# Patient Record
Sex: MALE | Race: WHITE | NOT HISPANIC OR LATINO | URBAN - METROPOLITAN AREA
[De-identification: names, ages, dates, MRNs, and addresses within clinical notes are randomized per-mention and may not be internally consistent; named-entity substitution may affect disease eponyms.]

---

## 2019-05-23 ENCOUNTER — EMERGENCY (EMERGENCY)
Facility: HOSPITAL | Age: 59
LOS: 1 days | Discharge: HOME | End: 2019-05-23
Attending: EMERGENCY MEDICINE | Admitting: EMERGENCY MEDICINE
Payer: COMMERCIAL

## 2019-05-23 VITALS
WEIGHT: 199.96 LBS | SYSTOLIC BLOOD PRESSURE: 169 MMHG | HEART RATE: 62 BPM | TEMPERATURE: 98 F | OXYGEN SATURATION: 100 % | DIASTOLIC BLOOD PRESSURE: 97 MMHG | RESPIRATION RATE: 16 BRPM

## 2019-05-23 VITALS
OXYGEN SATURATION: 98 % | RESPIRATION RATE: 16 BRPM | HEART RATE: 65 BPM | TEMPERATURE: 98 F | DIASTOLIC BLOOD PRESSURE: 88 MMHG | SYSTOLIC BLOOD PRESSURE: 155 MMHG

## 2019-05-23 LAB
ANION GAP SERPL CALC-SCNC: 8 MMOL/L — LOW (ref 9–16)
BUN SERPL-MCNC: 20 MG/DL — SIGNIFICANT CHANGE UP (ref 7–23)
CALCIUM SERPL-MCNC: 9.4 MG/DL — SIGNIFICANT CHANGE UP (ref 8.5–10.5)
CHLORIDE SERPL-SCNC: 107 MMOL/L — SIGNIFICANT CHANGE UP (ref 96–108)
CO2 SERPL-SCNC: 27 MMOL/L — SIGNIFICANT CHANGE UP (ref 22–31)
CREAT SERPL-MCNC: 1.33 MG/DL — HIGH (ref 0.5–1.3)
GLUCOSE SERPL-MCNC: 106 MG/DL — HIGH (ref 70–99)
HCT VFR BLD CALC: 46.6 % — SIGNIFICANT CHANGE UP (ref 39–50)
HGB BLD-MCNC: 16.1 G/DL — SIGNIFICANT CHANGE UP (ref 13–17)
MCHC RBC-ENTMCNC: 30.7 PG — SIGNIFICANT CHANGE UP (ref 27–34)
MCHC RBC-ENTMCNC: 34.5 G/DL — SIGNIFICANT CHANGE UP (ref 32–36)
MCV RBC AUTO: 88.9 FL — SIGNIFICANT CHANGE UP (ref 80–100)
PLATELET # BLD AUTO: 258 K/UL — SIGNIFICANT CHANGE UP (ref 150–400)
POTASSIUM SERPL-MCNC: 4.6 MMOL/L — SIGNIFICANT CHANGE UP (ref 3.5–5.3)
POTASSIUM SERPL-SCNC: 4.6 MMOL/L — SIGNIFICANT CHANGE UP (ref 3.5–5.3)
RBC # BLD: 5.24 M/UL — SIGNIFICANT CHANGE UP (ref 4.2–5.8)
RBC # FLD: 12.8 % — SIGNIFICANT CHANGE UP (ref 10.3–14.5)
SODIUM SERPL-SCNC: 142 MMOL/L — SIGNIFICANT CHANGE UP (ref 132–145)
WBC # BLD: 14 K/UL — HIGH (ref 3.8–10.5)
WBC # FLD AUTO: 14 K/UL — HIGH (ref 3.8–10.5)

## 2019-05-23 PROCEDURE — 99284 EMERGENCY DEPT VISIT MOD MDM: CPT

## 2019-05-23 PROCEDURE — 72131 CT LUMBAR SPINE W/O DYE: CPT | Mod: 26

## 2019-05-23 RX ORDER — DIAZEPAM 5 MG
5 TABLET ORAL ONCE
Refills: 0 | Status: DISCONTINUED | OUTPATIENT
Start: 2019-05-23 | End: 2019-05-23

## 2019-05-23 RX ORDER — CYCLOBENZAPRINE HYDROCHLORIDE 10 MG/1
10 TABLET, FILM COATED ORAL ONCE
Refills: 0 | Status: COMPLETED | OUTPATIENT
Start: 2019-05-23 | End: 2019-05-23

## 2019-05-23 RX ORDER — KETOROLAC TROMETHAMINE 30 MG/ML
30 SYRINGE (ML) INJECTION ONCE
Refills: 0 | Status: DISCONTINUED | OUTPATIENT
Start: 2019-05-23 | End: 2019-05-23

## 2019-05-23 RX ORDER — DIAZEPAM 5 MG
10 TABLET ORAL ONCE
Refills: 0 | Status: DISCONTINUED | OUTPATIENT
Start: 2019-05-23 | End: 2019-05-23

## 2019-05-23 RX ORDER — DEXAMETHASONE 0.5 MG/5ML
4 ELIXIR ORAL ONCE
Refills: 0 | Status: COMPLETED | OUTPATIENT
Start: 2019-05-23 | End: 2019-05-23

## 2019-05-23 RX ADMIN — Medication 30 MILLIGRAM(S): at 12:35

## 2019-05-23 RX ADMIN — Medication 10 MILLIGRAM(S): at 12:34

## 2019-05-23 RX ADMIN — Medication 30 MILLIGRAM(S): at 13:30

## 2019-05-23 RX ADMIN — CYCLOBENZAPRINE HYDROCHLORIDE 10 MILLIGRAM(S): 10 TABLET, FILM COATED ORAL at 15:10

## 2019-05-23 RX ADMIN — Medication 4 MILLIGRAM(S): at 12:34

## 2019-05-23 NOTE — ED PROVIDER NOTE - CLINICAL SUMMARY MEDICAL DECISION MAKING FREE TEXT BOX
60 y/o male with PMHx of chronic back pain presents to ED c/o lower back pain. Patient reports left lower back pain radiating down the left hip and the left leg for the past 6 days. States he was seen at [facility] yesterday and given steroids and muscle relaxer with improvement of pain. However, reports he was lifting a trash bag today and pain recurred. Patient denies any numbness, tingling, weakness, or rash. States he has had intermittent episodes of lower back spasm for the past 10 years, usually treated with NSAIDs and steroids.

## 2019-05-23 NOTE — ED PROVIDER NOTE - CARE PROVIDER_API CALL
Marcus Garcia)  Orthopedics  [address] [address]  Phone: [phone]  Fax: [phone]  Follow Up Time:     Timothy Marie)  Pain Medicine; PhysicalRehab Medicine  [address] [place] [id number]  Phone: [phone]  Fax: [phone]  Follow Up Time:

## 2019-05-23 NOTE — ED ADULT NURSE NOTE - NSELOPED_ED_ALL_ED
Call was placed to patient's given phone number to arrange for patient to  instructions and/or prescription./Physician notified

## 2019-05-23 NOTE — SBIRT NOTE. - NSSBIRTSERVICES_GEN_A_ED_FT
Provided SBIRT services : Full screen positive. Positive reinforcement provided given patient currently within healthy guidelines. Educational materials reviewed and given to patient .     Audit Score : 5    Dast Score :0    Duration : 5 Minutes

## 2019-05-23 NOTE — ED ADULT NURSE NOTE - NSIMPLEMENTINTERV_GEN_ALL_ED
Implemented All Universal Safety Interventions:  Bertha to call system. Call bell, personal items and telephone within reach. Instruct patient to call for assistance. Room bathroom lighting operational. Non-slip footwear when patient is off stretcher. Physically safe environment: no spills, clutter or unnecessary equipment. Stretcher in lowest position, wheels locked, appropriate side rails in place.

## 2019-05-23 NOTE — ED PROVIDER NOTE - OBJECTIVE STATEMENT
58 y/o male with PMHx of chronic back pain presents to ED c/o lower back pain. Patient reports left lower back pain radiating down the left hip and the left leg for the past 6 days. States he was seen at [facility] yesterday and given steroids and muscle relaxer with improvement of pain. However, reports he was lifting a trash bag today and pain recurred. Patient denies any numbness, tingling, weakness, or rash. States he has had intermittent episodes of lower back spasm for the past 10 years, usually treated with NSAIDs and steroids.

## 2019-05-23 NOTE — ED POST DISCHARGE NOTE - REASON FOR FOLLOW-UP
Other patient left with his IV accidentally but took it out at home and sent a photo of IV out and band aid on arm

## 2019-05-27 DIAGNOSIS — M54.5 LOW BACK PAIN: ICD-10-CM

## 2019-05-27 DIAGNOSIS — G89.29 OTHER CHRONIC PAIN: ICD-10-CM

## 2019-05-27 DIAGNOSIS — M54.42 LUMBAGO WITH SCIATICA, LEFT SIDE: ICD-10-CM

## 2020-07-05 NOTE — ED ADULT NURSE NOTE - NS ED NURSE ELOPE COMMENTS
Applied
patient left with iv in arm, was contacted later, and refused to come back. iv was removed by patient at home, and picture was sent. see Dr Tai note. Charge RN made aware.

## 2023-05-12 DIAGNOSIS — G47.00 INSOMNIA, UNSPECIFIED TYPE: ICD-10-CM

## 2023-05-12 RX ORDER — ZOLPIDEM TARTRATE 10 MG/1
10 TABLET ORAL NIGHTLY
Qty: 30 TABLET | Refills: 0 | Status: SHIPPED | OUTPATIENT
Start: 2023-05-12

## 2023-05-12 RX ORDER — ZOLPIDEM TARTRATE 10 MG/1
1 TABLET ORAL NIGHTLY
COMMUNITY
Start: 2021-08-26 | End: 2023-05-12 | Stop reason: SDUPTHER

## 2023-05-30 DIAGNOSIS — Z00.00 ANNUAL PHYSICAL EXAM: ICD-10-CM

## 2023-06-22 ENCOUNTER — LAB (OUTPATIENT)
Dept: LAB | Facility: LAB | Age: 63
End: 2023-06-22
Payer: COMMERCIAL

## 2023-06-22 DIAGNOSIS — Z00.00 ANNUAL PHYSICAL EXAM: ICD-10-CM

## 2023-06-22 LAB
ALANINE AMINOTRANSFERASE (SGPT) (U/L) IN SER/PLAS: 24 U/L (ref 10–52)
ALBUMIN (G/DL) IN SER/PLAS: 4.1 G/DL (ref 3.4–5)
ALKALINE PHOSPHATASE (U/L) IN SER/PLAS: 45 U/L (ref 33–136)
ANION GAP IN SER/PLAS: 12 MMOL/L (ref 10–20)
APPEARANCE, URINE: CLEAR
ASPARTATE AMINOTRANSFERASE (SGOT) (U/L) IN SER/PLAS: 28 U/L (ref 9–39)
BASOPHILS (10*3/UL) IN BLOOD BY AUTOMATED COUNT: 0.08 X10E9/L (ref 0–0.1)
BASOPHILS/100 LEUKOCYTES IN BLOOD BY AUTOMATED COUNT: 1.1 % (ref 0–2)
BILIRUBIN TOTAL (MG/DL) IN SER/PLAS: 0.8 MG/DL (ref 0–1.2)
BILIRUBIN, URINE: NEGATIVE
BLOOD, URINE: NEGATIVE
CALCIDIOL (25 OH VITAMIN D3) (NG/ML) IN SER/PLAS: 29 NG/ML
CALCIUM (MG/DL) IN SER/PLAS: 9.4 MG/DL (ref 8.6–10.3)
CARBON DIOXIDE, TOTAL (MMOL/L) IN SER/PLAS: 27 MMOL/L (ref 21–32)
CHLORIDE (MMOL/L) IN SER/PLAS: 107 MMOL/L (ref 98–107)
CHOLESTEROL (MG/DL) IN SER/PLAS: 193 MG/DL (ref 0–199)
CHOLESTEROL IN HDL (MG/DL) IN SER/PLAS: 56.3 MG/DL
CHOLESTEROL/HDL RATIO: 3.4
COLOR, URINE: YELLOW
CREATININE (MG/DL) IN SER/PLAS: 1.04 MG/DL (ref 0.5–1.3)
EOSINOPHILS (10*3/UL) IN BLOOD BY AUTOMATED COUNT: 0.26 X10E9/L (ref 0–0.7)
EOSINOPHILS/100 LEUKOCYTES IN BLOOD BY AUTOMATED COUNT: 3.7 % (ref 0–6)
ERYTHROCYTE DISTRIBUTION WIDTH (RATIO) BY AUTOMATED COUNT: 12.6 % (ref 11.5–14.5)
ERYTHROCYTE MEAN CORPUSCULAR HEMOGLOBIN CONCENTRATION (G/DL) BY AUTOMATED: 32.1 G/DL (ref 32–36)
ERYTHROCYTE MEAN CORPUSCULAR VOLUME (FL) BY AUTOMATED COUNT: 94 FL (ref 80–100)
ERYTHROCYTES (10*6/UL) IN BLOOD BY AUTOMATED COUNT: 4.89 X10E12/L (ref 4.5–5.9)
ESTIMATED AVERAGE GLUCOSE FOR HBA1C: 108 MG/DL
GFR MALE: 81 ML/MIN/1.73M2
GLUCOSE (MG/DL) IN SER/PLAS: 85 MG/DL (ref 74–99)
GLUCOSE, URINE: NEGATIVE MG/DL
HEMATOCRIT (%) IN BLOOD BY AUTOMATED COUNT: 45.8 % (ref 41–52)
HEMOGLOBIN (G/DL) IN BLOOD: 14.7 G/DL (ref 13.5–17.5)
HEMOGLOBIN A1C/HEMOGLOBIN TOTAL IN BLOOD: 5.4 %
IMMATURE GRANULOCYTES/100 LEUKOCYTES IN BLOOD BY AUTOMATED COUNT: 0.3 % (ref 0–0.9)
KETONES, URINE: NEGATIVE MG/DL
LDL: 122 MG/DL (ref 0–99)
LEUKOCYTE ESTERASE, URINE: NEGATIVE
LEUKOCYTES (10*3/UL) IN BLOOD BY AUTOMATED COUNT: 7 X10E9/L (ref 4.4–11.3)
LYMPHOCYTES (10*3/UL) IN BLOOD BY AUTOMATED COUNT: 2.27 X10E9/L (ref 1.2–4.8)
LYMPHOCYTES/100 LEUKOCYTES IN BLOOD BY AUTOMATED COUNT: 32.5 % (ref 13–44)
MONOCYTES (10*3/UL) IN BLOOD BY AUTOMATED COUNT: 0.86 X10E9/L (ref 0.1–1)
MONOCYTES/100 LEUKOCYTES IN BLOOD BY AUTOMATED COUNT: 12.3 % (ref 2–10)
NEUTROPHILS (10*3/UL) IN BLOOD BY AUTOMATED COUNT: 3.49 X10E9/L (ref 1.2–7.7)
NEUTROPHILS/100 LEUKOCYTES IN BLOOD BY AUTOMATED COUNT: 50.1 % (ref 40–80)
NITRITE, URINE: NEGATIVE
PH, URINE: 6 (ref 5–8)
PLATELETS (10*3/UL) IN BLOOD AUTOMATED COUNT: 264 X10E9/L (ref 150–450)
POTASSIUM (MMOL/L) IN SER/PLAS: 5.1 MMOL/L (ref 3.5–5.3)
PROSTATE SPECIFIC ANTIGEN,SCREEN: 3.44 NG/ML (ref 0–4)
PROTEIN TOTAL: 6.6 G/DL (ref 6.4–8.2)
PROTEIN, URINE: NEGATIVE MG/DL
SODIUM (MMOL/L) IN SER/PLAS: 141 MMOL/L (ref 136–145)
SPECIFIC GRAVITY, URINE: 1.02 (ref 1–1.03)
THYROTROPIN (MIU/L) IN SER/PLAS BY DETECTION LIMIT <= 0.05 MIU/L: 1.94 MIU/L (ref 0.44–3.98)
TRIGLYCERIDE (MG/DL) IN SER/PLAS: 73 MG/DL (ref 0–149)
UREA NITROGEN (MG/DL) IN SER/PLAS: 18 MG/DL (ref 6–23)
UROBILINOGEN, URINE: <2 MG/DL (ref 0–1.9)
VLDL: 15 MG/DL (ref 0–40)

## 2023-06-22 PROCEDURE — 82306 VITAMIN D 25 HYDROXY: CPT

## 2023-06-22 PROCEDURE — 84443 ASSAY THYROID STIM HORMONE: CPT

## 2023-06-22 PROCEDURE — 84153 ASSAY OF PSA TOTAL: CPT

## 2023-06-22 PROCEDURE — 85025 COMPLETE CBC W/AUTO DIFF WBC: CPT

## 2023-06-22 PROCEDURE — 80061 LIPID PANEL: CPT

## 2023-06-22 PROCEDURE — 36415 COLL VENOUS BLD VENIPUNCTURE: CPT

## 2023-06-22 PROCEDURE — 83036 HEMOGLOBIN GLYCOSYLATED A1C: CPT

## 2023-06-22 PROCEDURE — 80053 COMPREHEN METABOLIC PANEL: CPT

## 2023-06-22 PROCEDURE — 81003 URINALYSIS AUTO W/O SCOPE: CPT

## 2023-06-26 ENCOUNTER — OFFICE VISIT (OUTPATIENT)
Dept: PRIMARY CARE | Facility: CLINIC | Age: 63
End: 2023-06-26
Payer: COMMERCIAL

## 2023-06-26 VITALS
SYSTOLIC BLOOD PRESSURE: 110 MMHG | HEART RATE: 62 BPM | WEIGHT: 197.75 LBS | DIASTOLIC BLOOD PRESSURE: 68 MMHG | OXYGEN SATURATION: 98 % | BODY MASS INDEX: 25.38 KG/M2 | HEIGHT: 74 IN

## 2023-06-26 DIAGNOSIS — E55.9 VITAMIN D DEFICIENCY: ICD-10-CM

## 2023-06-26 DIAGNOSIS — R97.20 ELEVATED PSA: Primary | ICD-10-CM

## 2023-06-26 DIAGNOSIS — Z81.8 FAMILY HISTORY OF DEMENTIA: ICD-10-CM

## 2023-06-26 DIAGNOSIS — E78.2 MIXED HYPERLIPIDEMIA: ICD-10-CM

## 2023-06-26 PROCEDURE — 99396 PREV VISIT EST AGE 40-64: CPT | Performed by: INTERNAL MEDICINE

## 2023-06-26 PROCEDURE — 1036F TOBACCO NON-USER: CPT | Performed by: INTERNAL MEDICINE

## 2023-06-26 RX ORDER — DIAZEPAM 5 MG/1
TABLET ORAL
COMMUNITY
Start: 2022-01-31

## 2023-06-26 RX ORDER — IBUPROFEN 600 MG/1
600 TABLET ORAL EVERY 8 HOURS PRN
COMMUNITY

## 2023-06-26 RX ORDER — PANTOPRAZOLE SODIUM 40 MG/1
40 TABLET, DELAYED RELEASE ORAL DAILY
COMMUNITY
End: 2024-04-04 | Stop reason: SDUPTHER

## 2023-06-26 ASSESSMENT — ENCOUNTER SYMPTOMS
JOINT SWELLING: 0
FACIAL SWELLING: 0
CHEST TIGHTNESS: 0
SORE THROAT: 0
ADENOPATHY: 0
ABDOMINAL PAIN: 0
CONFUSION: 0
WEAKNESS: 0
PALPITATIONS: 0
BACK PAIN: 0
NECK STIFFNESS: 0
DIARRHEA: 0
ACTIVITY CHANGE: 0
HYPERACTIVE: 0
FEVER: 0
LIGHT-HEADEDNESS: 0
MYALGIAS: 0
ARTHRALGIAS: 0
HEMATURIA: 0
SINUS PAIN: 0
COUGH: 0
SHORTNESS OF BREATH: 0
FATIGUE: 0
BRUISES/BLEEDS EASILY: 0
CONSTIPATION: 0
UNEXPECTED WEIGHT CHANGE: 0
CHILLS: 0
NAUSEA: 0
BLOOD IN STOOL: 0
ABDOMINAL DISTENTION: 0
TROUBLE SWALLOWING: 0
DYSPHORIC MOOD: 0
SLEEP DISTURBANCE: 0
NERVOUS/ANXIOUS: 0
FREQUENCY: 0
VOMITING: 0
CONSTITUTIONAL NEGATIVE: 1
HEADACHES: 0
NUMBNESS: 0
DYSURIA: 0
DIZZINESS: 0
RHINORRHEA: 0
POLYDIPSIA: 0
SINUS PRESSURE: 0
WHEEZING: 0

## 2023-06-26 NOTE — ASSESSMENT & PLAN NOTE
Lipids are just above target.  His cardiovascular risk is relatively low he is not a candidate for statins at this time.

## 2023-06-26 NOTE — ASSESSMENT & PLAN NOTE
Patient will continue to remain physically and mentally active.  He is not interested in doing a genetic testing at this time.

## 2023-06-26 NOTE — PROGRESS NOTES
Subjective   Patient ID: Luis Ureña is a 63 y.o. male who presents for Annual Exam.    Patient is here for a complete physical and a general checkup.  He feels generally quite well has a couple minor issues.  1.  He has bilateral tennis elbow.  He has been using ibuprofen from time to time that does give him some relief.  However symptoms are fairly persistent.  2.  He gets occasional episodes of GERD.  He drinks occasionally and that certainly makes his symptoms worse.  He does take pantoprazole for this with fairly good relief of symptoms.  3.  He has occasional difficulty sleeping usually after consuming alcohol.  Overall though his sleep is good.  4.  We reviewed his labs which showed a elevated PSA versus last year.  Patient is asymptomatic.  Patient was not particularly physically or sexually active prior to the testing.  5.  Patient vitamin D level was noted to be low.  6.  Patient's mother and sister both had dementia at fairly young ages.  He is concerned about this.  He has no signs of memory loss at this point.  His sister is being seen by a dementia specialist in [place] she may or may not had any genetic testing done he is going to check.  Otherwise he feels quite well he exercises vigorously on a regular basis works out with a  3-4 times a week.  Still plays a lot of golf and tennis.         Review of Systems   Constitutional: Negative.  Negative for activity change, chills, fatigue, fever and unexpected weight change.   HENT:  Negative for dental problem, ear pain, facial swelling, hearing loss, mouth sores, rhinorrhea, sinus pressure, sinus pain, sore throat, tinnitus and trouble swallowing.    Eyes:  Negative for visual disturbance.   Respiratory:  Negative for cough, chest tightness, shortness of breath and wheezing.    Cardiovascular:  Negative for chest pain, palpitations and leg swelling.   Gastrointestinal:  Negative for abdominal distention, abdominal pain, blood in stool,  "constipation, diarrhea, nausea and vomiting.   Endocrine: Negative for cold intolerance, heat intolerance, polydipsia and polyuria.   Genitourinary:  Negative for dysuria, frequency, hematuria and urgency.   Musculoskeletal:  Negative for arthralgias, back pain, joint swelling, myalgias and neck stiffness.   Skin:  Negative for rash.   Allergic/Immunologic: Negative for food allergies.   Neurological:  Negative for dizziness, weakness, light-headedness, numbness and headaches.   Hematological:  Negative for adenopathy. Does not bruise/bleed easily.   Psychiatric/Behavioral:  Negative for confusion, dysphoric mood and sleep disturbance. The patient is not nervous/anxious and is not hyperactive.        Objective   /68   Pulse 62   Ht 1.87 m (6' 1.62\")   Wt 89.7 kg (197 lb 12 oz)   SpO2 98%   BMI 25.65 kg/m²     Physical Exam  Constitutional:       Appearance: Normal appearance. He is normal weight.   HENT:      Head: Normocephalic.      Right Ear: Tympanic membrane and ear canal normal.      Left Ear: Tympanic membrane and ear canal normal.      Nose: Nose normal.      Mouth/Throat:      Pharynx: Oropharynx is clear.   Eyes:      Extraocular Movements: Extraocular movements intact.      Conjunctiva/sclera: Conjunctivae normal.      Pupils: Pupils are equal, round, and reactive to light.   Neck:      Thyroid: No thyroid mass or thyromegaly.      Vascular: No carotid bruit or JVD.   Cardiovascular:      Rate and Rhythm: Normal rate and regular rhythm.      Pulses: Normal pulses.           Carotid pulses are 2+ on the right side and 2+ on the left side.       Radial pulses are 2+ on the right side and 2+ on the left side.        Femoral pulses are 2+ on the right side and 2+ on the left side.       Popliteal pulses are 2+ on the right side and 2+ on the left side.        Dorsalis pedis pulses are 2+ on the right side and 2+ on the left side.        Posterior tibial pulses are 2+ on the right side and 2+ on the " left side.      Heart sounds: Normal heart sounds. No murmur heard.  Pulmonary:      Effort: Pulmonary effort is normal.      Breath sounds: Normal breath sounds.   Abdominal:      General: Abdomen is flat. Bowel sounds are normal. There is no distension.      Palpations: Abdomen is soft. There is no mass.      Tenderness: There is no guarding or rebound.   Genitourinary:     Penis: Normal.       Prostate: Normal.      Rectum: Normal.   Musculoskeletal:         General: Normal range of motion.      Cervical back: Normal range of motion and neck supple.      Right lower leg: No edema.      Left lower leg: No edema.   Lymphadenopathy:      Cervical: No cervical adenopathy.   Skin:     General: Skin is warm and dry.      Findings: No lesion or rash.   Neurological:      General: No focal deficit present.      Mental Status: He is alert and oriented to person, place, and time.   Psychiatric:         Mood and Affect: Mood normal.         Assessment/Plan   Problem List Items Addressed This Visit       Elevated PSA - Primary     We will have him avoid heavy physical activity and sexual activity for 4 days before getting repeat lab testing 1 month.         Relevant Orders    Prostate Spec.Ag,Screen    Vitamin D deficiency     We will start vitamin D 2000 units daily.         Mixed hyperlipidemia     Lipids are just above target.  His cardiovascular risk is relatively low he is not a candidate for statins at this time.         Family history of dementia     Patient will continue to remain physically and mentally active.  He is not interested in doing a genetic testing at this time.

## 2023-06-26 NOTE — ASSESSMENT & PLAN NOTE
We will have him avoid heavy physical activity and sexual activity for 4 days before getting repeat lab testing 1 month.

## 2023-07-08 ENCOUNTER — LAB (OUTPATIENT)
Dept: LAB | Facility: LAB | Age: 63
End: 2023-07-08
Payer: COMMERCIAL

## 2023-07-08 DIAGNOSIS — R97.20 ELEVATED PSA: ICD-10-CM

## 2023-07-08 LAB — PROSTATE SPECIFIC ANTIGEN,SCREEN: 2.49 NG/ML (ref 0–4)

## 2023-07-08 PROCEDURE — 84153 ASSAY OF PSA TOTAL: CPT

## 2023-07-08 PROCEDURE — 36415 COLL VENOUS BLD VENIPUNCTURE: CPT

## 2023-11-15 DIAGNOSIS — R97.20 ELEVATED PSA: Primary | ICD-10-CM

## 2023-11-16 ENCOUNTER — LAB (OUTPATIENT)
Dept: LAB | Facility: LAB | Age: 63
End: 2023-11-16
Payer: COMMERCIAL

## 2023-11-16 DIAGNOSIS — R97.20 ELEVATED PSA: ICD-10-CM

## 2023-11-16 PROCEDURE — 84153 ASSAY OF PSA TOTAL: CPT

## 2023-11-16 PROCEDURE — 84154 ASSAY OF PSA FREE: CPT

## 2023-11-16 PROCEDURE — 36415 COLL VENOUS BLD VENIPUNCTURE: CPT

## 2023-11-19 LAB
PSA FREE MFR SERPL: 15 %
PSA FREE SERPL-MCNC: 0.4 NG/ML
PSA SERPL IA-MCNC: 2.7 NG/ML (ref 0–4)

## 2024-04-04 DIAGNOSIS — K21.9 GASTROESOPHAGEAL REFLUX DISEASE WITHOUT ESOPHAGITIS: Primary | ICD-10-CM

## 2024-04-04 RX ORDER — PANTOPRAZOLE SODIUM 40 MG/1
40 TABLET, DELAYED RELEASE ORAL DAILY
Qty: 90 TABLET | Refills: 1 | Status: SHIPPED | OUTPATIENT
Start: 2024-04-04

## 2024-04-09 ENCOUNTER — APPOINTMENT (OUTPATIENT)
Dept: PRIMARY CARE | Facility: CLINIC | Age: 64
End: 2024-04-09
Payer: COMMERCIAL

## 2024-06-05 ENCOUNTER — OFFICE VISIT (OUTPATIENT)
Dept: ORTHOPEDIC SURGERY | Facility: HOSPITAL | Age: 64
End: 2024-06-05
Payer: COMMERCIAL

## 2024-06-05 ENCOUNTER — HOSPITAL ENCOUNTER (OUTPATIENT)
Dept: RADIOLOGY | Facility: HOSPITAL | Age: 64
Discharge: HOME | End: 2024-06-05
Payer: COMMERCIAL

## 2024-06-05 ENCOUNTER — HOSPITAL ENCOUNTER (OUTPATIENT)
Dept: RADIOLOGY | Facility: HOSPITAL | Age: 64
End: 2024-06-05
Payer: COMMERCIAL

## 2024-06-05 VITALS — HEIGHT: 74 IN | WEIGHT: 193 LBS | BODY MASS INDEX: 24.77 KG/M2

## 2024-06-05 DIAGNOSIS — M54.16 ACUTE LEFT LUMBAR RADICULOPATHY: Primary | ICD-10-CM

## 2024-06-05 DIAGNOSIS — M79.605 LEFT LEG PAIN: ICD-10-CM

## 2024-06-05 PROCEDURE — 1036F TOBACCO NON-USER: CPT | Performed by: STUDENT IN AN ORGANIZED HEALTH CARE EDUCATION/TRAINING PROGRAM

## 2024-06-05 PROCEDURE — 72110 X-RAY EXAM L-2 SPINE 4/>VWS: CPT

## 2024-06-05 PROCEDURE — 73502 X-RAY EXAM HIP UNI 2-3 VIEWS: CPT | Mod: LT

## 2024-06-05 PROCEDURE — 99214 OFFICE O/P EST MOD 30 MIN: CPT | Performed by: STUDENT IN AN ORGANIZED HEALTH CARE EDUCATION/TRAINING PROGRAM

## 2024-06-05 PROCEDURE — 73502 X-RAY EXAM HIP UNI 2-3 VIEWS: CPT | Mod: LEFT SIDE | Performed by: RADIOLOGY

## 2024-06-05 PROCEDURE — 72110 X-RAY EXAM L-2 SPINE 4/>VWS: CPT | Performed by: RADIOLOGY

## 2024-06-05 RX ORDER — METHYLPREDNISOLONE 4 MG/1
TABLET ORAL
Qty: 21 TABLET | Refills: 0 | Status: SHIPPED | OUTPATIENT
Start: 2024-06-05

## 2024-06-05 ASSESSMENT — PAIN DESCRIPTION - DESCRIPTORS: DESCRIPTORS: ACHING;SHOOTING

## 2024-06-05 ASSESSMENT — PAIN SCALES - GENERAL: PAINLEVEL_OUTOF10: 7

## 2024-06-05 ASSESSMENT — PAIN - FUNCTIONAL ASSESSMENT: PAIN_FUNCTIONAL_ASSESSMENT: 0-10

## 2024-06-05 NOTE — PROGRESS NOTES
REFERRAL SOURCE: No ref. provider found     CHIEF COMPLAINT: left lower extremity pain    HISTORY OF PRESENT ILLNESS  Luis Ureña is a very pleasant 64 y.o. male with history of GERD who presents with left lower extremity pain.     6/5/2024: He reports a history of back problems a few years ago related to left lumbar radiculopathy.  He underwent 3 injections by Dr. Butler which resolved the pain.  At that time, his pain was very severe and constant.  Currently, he is getting more mild and intermittent pain that radiates from the upper buttock down the lateral left leg to about mid shin.  Pain is worse after prolonged sitting.  Denies new weakness, but he does report some residual weakness in the left ankle following his previous back injuries.  He has an excellent warm up routine and is doing core strengthening and exercises that he learned the last time his back flared on a regular basis.  He is active enjoying playing tennis and walking for exercise.  He is the owner of [employer].    MEDS    Current Outpatient Medications:     diazePAM (Valium) 5 mg tablet, Take by mouth., Disp: , Rfl:     ibuprofen 600 mg tablet, Take 1 tablet (600 mg) by mouth every 8 hours if needed., Disp: , Rfl:     pantoprazole (ProtoNix) 40 mg EC tablet, Take 1 tablet (40 mg) by mouth once daily., Disp: 90 tablet, Rfl: 1    zolpidem (Ambien) 10 mg tablet, Take 1 tablet (10 mg) by mouth once daily at bedtime., Disp: 30 tablet, Rfl: 0    ALLERGIES  Not on File    PAST MEDICAL HISTORY  Past Medical History:   Diagnosis Date    Other specified health status     No pertinent past medical history       PAST SURGICAL HISTORY  No past surgical history on file.    SOCIAL HISTORY   Social History     Socioeconomic History    Marital status:      Spouse name: Not on file    Number of children: Not on file    Years of education: Not on file    Highest education level: Not on file   Occupational History    Not on file   Tobacco Use     Smoking status: Never    Smokeless tobacco: Never   Substance and Sexual Activity    Alcohol use: Never    Drug use: Never    Sexual activity: Not on file   Other Topics Concern    Not on file   Social History Narrative    Not on file     Social Determinants of Health     Financial Resource Strain: Not on file   Food Insecurity: Not on file   Transportation Needs: Not on file   Physical Activity: Not on file   Stress: Not on file   Social Connections: Not on file   Intimate Partner Violence: Not on file   Housing Stability: Not on file       FAMILY HISTORY  No family history on file.    REVIEW OF SYSTEMS  Except for those mentioned in the history of present illness, and below, a complete review of systems is negative.     Review of Systems    VITALS  There were no vitals filed for this visit.    PHYSICAL EXAMINATION   GENERAL:  Awake, alert, and oriented, no apparent distress, pleasant, and cooperative  PSYC: Mood is euthymic, affect is congruent  EAR, NOSE, THROAT:  Normocephalic, atraumatic, moist membranes, anicteric sclera  LUNG: Nonlabored breathing  HEART: No clubbing or cyanosis  SKIN: No increased erythema, warmth, rashes, or concerning skin lesions  NEURO: Sensation is intact in the bilateral upper and lower extremities. Strength is grossly 5 out of 5 throughout the bilateral upper and lower extremities, unless noted below. Negative straight leg raise and seated slump bilaterally.   GAIT: Non-antalgic.  Mild left foot drop when walking.  Can heel and toe walk without difficulty. No myelopathic features.   SPINE: Lumbar spine range of motion is full and pain-free.  No tenderness palpation over the buttock or lumbar spine.  Facet loading maneuvers are negative.  MUSCULOSKELETAL: Bilateral hip range of motion is limited in internal rotation lacking 5 degrees from neutral but nonpainful. Erlin's is negative bilaterally.     IMAGING STUDIES:   Radiographs left hip dated 6/5/2024 were personally reviewed and  interpreted by me, Dr. Laina Arboleda, and the findings shared with the patient.  Mild left hip osteoarthritis with cam morphology of the femoral head.    Radiographs lumbar spine dated 6/5/2024 were personally reviewed and interpreted by me, Dr. Laina Arboleda, and the findings shared with the patient.  Mild multilevel lumbar degenerative change with facet arthrosis.     IMPRESSION  #1  Left lumbar radiculopathy    PLAN  The following was discussed with the patient:     Luis Ureña is a very pleasant 64 y.o. male with history of GERD who presents with left lower extremity pain due to left lumbar radiculopathy.  He does have evidence of mild left hip osteoarthritis, which I do not think is contributing to his symptoms.   -Discussed the above.  -He was encouraged to continue with his strength-based exercises and home exercise program.  -He was given a prescription for Medrol Dosepak to help with his current symptoms.  He was counseled the side effects.  -If the Medrol Dosepak is not helpful, he should return to see Dr. Butler for consideration of injections.  -Follow-up with me for his back as needed.  I am also happy to see him for his Achilles.     The patient was counseled to remain active, but avoid activities that worsen symptoms. The patient was in agreement with this plan. All questions were answered to the best of my ability.    PATIENT EDUCATION:  Education was discussed at today's appointment. A learning needs assessment was performed.    Primary learner: Luis Ureña  Barriers to learning: None  Preferred language: English  Learning preferences include: Seeing and doing.  Discussed: Diagnosis and treatment plan.  Demonstrated: Understanding of material discussed.  Patient education materials given: None.  Learner response: Learner demonstrated understanding.    This note was dictated using Dragon speech recognition software and was not corrected for spelling or grammatical errors.

## 2024-06-12 DIAGNOSIS — Z00.00 ANNUAL PHYSICAL EXAM: ICD-10-CM

## 2024-06-13 DIAGNOSIS — Z00.00 ANNUAL PHYSICAL EXAM: ICD-10-CM

## 2024-06-13 DIAGNOSIS — M48.062 SPINAL STENOSIS OF LUMBAR REGION WITH NEUROGENIC CLAUDICATION: Primary | ICD-10-CM

## 2024-06-13 RX ORDER — PREDNISONE 10 MG/1
TABLET ORAL DAILY
Qty: 30 TABLET | Refills: 0 | Status: SHIPPED | OUTPATIENT
Start: 2024-06-13 | End: 2024-06-23

## 2024-06-15 ENCOUNTER — LAB (OUTPATIENT)
Dept: LAB | Facility: LAB | Age: 64
End: 2024-06-15
Payer: COMMERCIAL

## 2024-06-15 DIAGNOSIS — Z00.00 ANNUAL PHYSICAL EXAM: ICD-10-CM

## 2024-06-15 LAB
25(OH)D3 SERPL-MCNC: 31 NG/ML (ref 30–100)
ALBUMIN SERPL BCP-MCNC: 4.2 G/DL (ref 3.4–5)
ALP SERPL-CCNC: 48 U/L (ref 33–136)
ALT SERPL W P-5'-P-CCNC: 22 U/L (ref 10–52)
ANION GAP SERPL CALC-SCNC: 8 MMOL/L (ref 10–20)
APPEARANCE UR: CLEAR
AST SERPL W P-5'-P-CCNC: 26 U/L (ref 9–39)
BASOPHILS # BLD AUTO: 0.08 X10*3/UL (ref 0–0.1)
BASOPHILS NFR BLD AUTO: 1.1 %
BILIRUB SERPL-MCNC: 0.9 MG/DL (ref 0–1.2)
BILIRUB UR STRIP.AUTO-MCNC: NEGATIVE MG/DL
BUN SERPL-MCNC: 18 MG/DL (ref 6–23)
CALCIUM SERPL-MCNC: 9 MG/DL (ref 8.6–10.3)
CHLORIDE SERPL-SCNC: 104 MMOL/L (ref 98–107)
CHOLEST SERPL-MCNC: 211 MG/DL (ref 0–199)
CHOLESTEROL/HDL RATIO: 3.5
CO2 SERPL-SCNC: 29 MMOL/L (ref 21–32)
COLOR UR: NORMAL
CREAT SERPL-MCNC: 1.1 MG/DL (ref 0.5–1.3)
EGFRCR SERPLBLD CKD-EPI 2021: 76 ML/MIN/1.73M*2
EOSINOPHIL # BLD AUTO: 0.24 X10*3/UL (ref 0–0.7)
EOSINOPHIL NFR BLD AUTO: 3.3 %
ERYTHROCYTE [DISTWIDTH] IN BLOOD BY AUTOMATED COUNT: 12.9 % (ref 11.5–14.5)
EST. AVERAGE GLUCOSE BLD GHB EST-MCNC: 103 MG/DL
GLUCOSE SERPL-MCNC: 91 MG/DL (ref 74–99)
GLUCOSE UR STRIP.AUTO-MCNC: NORMAL MG/DL
HBA1C MFR BLD: 5.2 %
HCT VFR BLD AUTO: 45.3 % (ref 41–52)
HCV AB SER QL: NONREACTIVE
HDLC SERPL-MCNC: 60.2 MG/DL
HGB BLD-MCNC: 14.8 G/DL (ref 13.5–17.5)
IMM GRANULOCYTES # BLD AUTO: 0.03 X10*3/UL (ref 0–0.7)
IMM GRANULOCYTES NFR BLD AUTO: 0.4 % (ref 0–0.9)
KETONES UR STRIP.AUTO-MCNC: NEGATIVE MG/DL
LDLC SERPL CALC-MCNC: 138 MG/DL
LEUKOCYTE ESTERASE UR QL STRIP.AUTO: NEGATIVE
LYMPHOCYTES # BLD AUTO: 2.2 X10*3/UL (ref 1.2–4.8)
LYMPHOCYTES NFR BLD AUTO: 30.1 %
MCH RBC QN AUTO: 30.9 PG (ref 26–34)
MCHC RBC AUTO-ENTMCNC: 32.7 G/DL (ref 32–36)
MCV RBC AUTO: 95 FL (ref 80–100)
MONOCYTES # BLD AUTO: 1.04 X10*3/UL (ref 0.1–1)
MONOCYTES NFR BLD AUTO: 14.2 %
NEUTROPHILS # BLD AUTO: 3.72 X10*3/UL (ref 1.2–7.7)
NEUTROPHILS NFR BLD AUTO: 50.9 %
NITRITE UR QL STRIP.AUTO: NEGATIVE
NON HDL CHOLESTEROL: 151 MG/DL (ref 0–149)
NRBC BLD-RTO: 0 /100 WBCS (ref 0–0)
PH UR STRIP.AUTO: 7 [PH]
PLATELET # BLD AUTO: 244 X10*3/UL (ref 150–450)
POTASSIUM SERPL-SCNC: 4.6 MMOL/L (ref 3.5–5.3)
PROT SERPL-MCNC: 6.6 G/DL (ref 6.4–8.2)
PROT UR STRIP.AUTO-MCNC: NEGATIVE MG/DL
PSA SERPL-MCNC: 2.46 NG/ML
RBC # BLD AUTO: 4.79 X10*6/UL (ref 4.5–5.9)
RBC # UR STRIP.AUTO: NEGATIVE /UL
SODIUM SERPL-SCNC: 136 MMOL/L (ref 136–145)
SP GR UR STRIP.AUTO: 1.02
TRIGL SERPL-MCNC: 64 MG/DL (ref 0–149)
TSH SERPL-ACNC: 1.31 MIU/L (ref 0.44–3.98)
UROBILINOGEN UR STRIP.AUTO-MCNC: NORMAL MG/DL
VLDL: 13 MG/DL (ref 0–40)
WBC # BLD AUTO: 7.3 X10*3/UL (ref 4.4–11.3)

## 2024-06-15 PROCEDURE — 36415 COLL VENOUS BLD VENIPUNCTURE: CPT

## 2024-06-15 PROCEDURE — 86803 HEPATITIS C AB TEST: CPT

## 2024-06-15 PROCEDURE — 84153 ASSAY OF PSA TOTAL: CPT

## 2024-06-15 PROCEDURE — 82306 VITAMIN D 25 HYDROXY: CPT

## 2024-06-15 PROCEDURE — 83036 HEMOGLOBIN GLYCOSYLATED A1C: CPT

## 2024-06-20 ENCOUNTER — APPOINTMENT (OUTPATIENT)
Dept: PAIN MEDICINE | Facility: HOSPITAL | Age: 64
End: 2024-06-20
Payer: COMMERCIAL

## 2024-06-25 ENCOUNTER — OFFICE VISIT (OUTPATIENT)
Dept: PAIN MEDICINE | Facility: HOSPITAL | Age: 64
End: 2024-06-25
Payer: COMMERCIAL

## 2024-06-25 DIAGNOSIS — M48.062 SPINAL STENOSIS OF LUMBAR REGION WITH NEUROGENIC CLAUDICATION: Primary | ICD-10-CM

## 2024-06-25 PROCEDURE — 99214 OFFICE O/P EST MOD 30 MIN: CPT | Performed by: ANESTHESIOLOGY

## 2024-06-25 PROCEDURE — 99204 OFFICE O/P NEW MOD 45 MIN: CPT | Performed by: ANESTHESIOLOGY

## 2024-06-25 ASSESSMENT — PAIN SCALES - GENERAL: PAINLEVEL_OUTOF10: 7

## 2024-06-25 ASSESSMENT — PAIN - FUNCTIONAL ASSESSMENT: PAIN_FUNCTIONAL_ASSESSMENT: 0-10

## 2024-06-27 NOTE — PROGRESS NOTES
Chief Complaint   Patient presents with    Leg Pain     63 y/o male c/o leg pain        HPI   Mr. Luis Ureña is a 64-year-old male with a history of low back and left leg pain.  The patient has been seen for this issue remotely, back in 2019 by my colleague Dr. Butler and he underwent a series of epidural/transforaminal injections.  The patient says that those injections were really helpful and ultimately relieved his symptoms.  More recently he has had recurrent low back and left leg pain which has been severe in nature.  He says the pain starts in the low back/buttock area on the left side and radiates into the hip and leg down to the calf.  He had rather sudden onset of the pain 60 to 90 days ago.  He did see a sports medicine who gave him a Medrol Dosepak and also saw his primary care physician Dr. Marie who gave him a higher dose steroid pack.  He does note that at this time that he has very little pain returned once he is completely off steroids.  Prior to this most recent course of steroids his pain could be as high as a 9 or 10 out of 10.    The patient works out regularly, does core strengthening exercises several times a week, has done prior formal physical therapy.    ROS: 13 point review of systems is complete and is negative listed above in HPI    Past Medical History:   Diagnosis Date    Other specified health status     No pertinent past medical history       No past surgical history on file.    No family history on file.    Social History     Tobacco Use    Smoking status: Never    Smokeless tobacco: Never   Substance Use Topics    Alcohol use: Never    Drug use: Never       Current Outpatient Medications on File Prior to Visit   Medication Sig Dispense Refill    diazePAM (Valium) 5 mg tablet Take by mouth.      ibuprofen 600 mg tablet Take 1 tablet (600 mg) by mouth every 8 hours if needed.      methylPREDNISolone (Medrol Dospak) 4 mg tablets Use as directed by package instructions. Take 6  pills on day 1, 5 pills on day 2, 4 pills on day 3, 3 pills on day 4, 2 pills on day 5, and 1 pill on day 6. 21 tablet 0    pantoprazole (ProtoNix) 40 mg EC tablet Take 1 tablet (40 mg) by mouth once daily. 90 tablet 1    [] predniSONE (Deltasone) 10 mg tablet Take 5 tablets (50 mg) by mouth once daily for 2 days, THEN 4 tablets (40 mg) once daily for 2 days, THEN 3 tablets (30 mg) once daily for 2 days, THEN 2 tablets (20 mg) once daily for 2 days, THEN 1 tablet (10 mg) once daily for 2 days. 30 tablet 0    zolpidem (Ambien) 10 mg tablet Take 1 tablet (10 mg) by mouth once daily at bedtime. 30 tablet 0     No current facility-administered medications on file prior to visit.        No Known Allergies       Imaging:  Narrative & Impression   MRN: [id number]  Patient Name: GABRIELLE ROMAN     STUDY:  MRI L-SPINE WO; 2019 8:54 am     INDICATION:  low back pain radiating to the left leg     COMPARISON:  Lumbar spine x-rays, 2017     ACCESSION NUMBER(S):  [id number]     ORDERING CLINICIAN:  RHEA MORELAND     TECHNIQUE:  Sagittal T1, T2, STIR, axial T1 and T2 weighted images of the lumbar  spine were acquired.     FINDINGS:  Alignment: The vertebral alignment is maintained.     Vertebrae/Intervertebral Discs: Anterior wedging and T12 without  associated marrow edema, vertebral body height is otherwise  preserved.The marrow signal is within normal limits. Multilevel loss  of normal disc T2 signal and disc height, most pronounced at L4-5.  Shortened pedicles result in diffuse at least mild spinal canal  narrowing in the lumbar spine.     Conus: The lower thoracic cord appears unremarkable. The conus  terminates at L1.     T12-L1: There is no significant neural foraminal stenosis.     L1-2: Minimal disc bulge. There is no significant neural foraminal  stenosis.     L2-3: Disc bulge, facet hypertrophy, and ligamentum flavum thickening  with moderate central spinal canal narrowing and severe narrowing of  the  subarticular recesses, left greater than right, as well as mild  left neural foraminal narrowing.     L3-4: Disc bulge, facet hypertrophy, and ligamentum flavum thickening  with mild central spinal canal narrowing and moderate narrowing of  both subarticular recesses.     L4-5: Disc bulge with an associated annular fissure and a  superimposed superiorly directed left central and foraminal disc  extrusion. This results in moderate central spinal canal and right  subarticular recess narrowing as well as severe left subarticular  recess narrowing. There is also mild right and moderate left neural  foraminal narrowing.     L5-S1: Disc bulge and facet hypertrophy. There is no significant  central canal or neural foraminal stenosis.     Canal and foraminal narrowing results in impingement of both  descending L3 nerve roots in the subarticular recesses at L2-3 and  the descending left L5 nerve root in the subarticular recess at L4-5.  There is also probable impingement of the exiting left L4 nerve root  secondary to a disc extrusion.     Paraspinal muscular atrophy is noted in the lower lumbar spine and  visualized portion of the sacrum.     IMPRESSION:  Multilevel degenerative changes superimposed on congenital spinal  canal narrowing, most pronounced at L4-5 where a disc extrusion  results in left-sided nerve root impingement.       Physical Exam:  Gen.: No acute distress noted  Eyes: Pupils are symmetric  ENT: Hearing is grossly intact  Neck: No JVD noted, tracheal position is midline  Respiratory: No shortness of breath  Cardiovascular: Extremity show no edema  Lymph: No lymphadenopathy noted in the anterior cervical regions bilaterally  Skin: No rashes or open lesions or ulcers identified on the skin  Musculoskeletal: Positive SLR on left side only, otherwise intact strength/motor/reflex  Neurologic: Cranial nerves II through XII are grossly intact  Psychiatric:  Patient is alert and oriented  x3      Impression/Plan:  64-year-old male with a history of low back and leg symptoms and known spinal stenosis who has low back and left-sided leg pain.  Right now his pain is much better controlled because he has been on high-dose steroids, more than 1 round and he is wondering what next steps in management would be considered.    -Will obtain authorization for a left-sided transforaminal to target the L3 and L4 levels.  Procedure, risk, benefits, alternatives reviewed with the patient.  It is hard to tell at this point when his pain will recur or if it will.  Prior to the second round of steroids his pain was intractable and at times a 10 out of 10.    -If injections are pursued long-lasting relief we discussed that those could be repeated however if we did not see long-lasting relief we could consider more advanced interventional pain procedures or referral to spine surgery.    -We discussed that the mainstay of treatment is ongoing physical therapy and core strengthening as a treatment and preventative measure for his back and leg symptoms.    -Lastly we discussed the potential for advanced imaging at some point however would not order it at this time as it would not change any management.  Patient does not have any pain today to speak of and if his symptoms were to recur or he was to be refractory to conservative measures would have a low threshold to get imaging of the spine.  He does have a component of congenital narrowing based on imaging that I personally reviewed in PACS as well as age-related degenerative changes.

## 2024-07-01 ENCOUNTER — APPOINTMENT (OUTPATIENT)
Dept: INTEGRATIVE MEDICINE | Facility: CLINIC | Age: 64
End: 2024-07-01
Payer: COMMERCIAL

## 2024-07-01 DIAGNOSIS — M54.16 LEFT LUMBAR RADICULOPATHY: ICD-10-CM

## 2024-07-01 DIAGNOSIS — M99.04 SEGMENTAL AND SOMATIC DYSFUNCTION OF SACRAL REGION: ICD-10-CM

## 2024-07-01 DIAGNOSIS — M48.062 SPINAL STENOSIS OF LUMBAR REGION WITH NEUROGENIC CLAUDICATION: ICD-10-CM

## 2024-07-01 DIAGNOSIS — M99.03 SEGMENTAL AND SOMATIC DYSFUNCTION OF LUMBAR REGION: Primary | ICD-10-CM

## 2024-07-01 DIAGNOSIS — M99.05 SEGMENTAL AND SOMATIC DYSFUNCTION OF PELVIC REGION: ICD-10-CM

## 2024-07-01 PROCEDURE — 99202 OFFICE O/P NEW SF 15 MIN: CPT | Performed by: CHIROPRACTOR

## 2024-07-01 PROCEDURE — 97112 NEUROMUSCULAR REEDUCATION: CPT | Performed by: CHIROPRACTOR

## 2024-07-01 PROCEDURE — 98940 CHIROPRACT MANJ 1-2 REGIONS: CPT | Performed by: CHIROPRACTOR

## 2024-07-01 ASSESSMENT — ENCOUNTER SYMPTOMS
NAUSEA: 0
BACK PAIN: 1
DIFFICULTY URINATING: 0
VOMITING: 0
ACTIVITY CHANGE: 0
DIZZINESS: 0
NUMBNESS: 0

## 2024-07-01 NOTE — PROGRESS NOTES
"Subjective   Patient ID: Luis Ureña is a 64 y.o. male who presents today, July 1, 2024 for a new patient evaluation and treatment of lumbar radicular pain.    (1/12) [facility]    Chiropractic Medicine HPI:  Provacative factors include : sitting, transitions  Palliative factors incude : movement, stretching, steroids, RX meds  Pain is described as : ache, stiff, sharp   Previous treatment for complaint has included : stretching, injections, Rx medications, Pain management  Patient denies : trauma/accidents/injuries/falls (last 6 months), numbness/tingling, bladder weakness, bowel weakness, difficulty walking, instability  Intensity of the pain:    Completed Oswestry Disability Index prior to visit: yes     HPI : Luis (\"Bill\"Ana Ureña presents to my office for chiropractic consultation and evaluation. He arrives with a main complaint of left gluteal and leg pain. He recalls initial onset of left lower back and leg pain following a disc extrusion with left-sided nerve root impingement at the L4-5 level in 2019. He received a series of 3 lumbar injections through pain management. He also worked with a medical yoga practitioner who helped him to develop an home mobility/stretching program that he uses to this day. He states that symptoms improved following these treatments in 2019 and he was pain-free until about 2 months ago. He states that he began to experience discomfort, without triggering incident, from the left lateral buttock region extending down the lateral leg to the lateral calf and shin. He describes lateral gluteal pain as occasionally sharp. He denies numbness, tingling. Denies bowel/bladder changes. He was prescribed a tapered steroid and then another stronger steroid by his PCP that seemed to help quite a bit. He recently finished his prescribed steroid 2 days ago and symptoms remain improved. He does continue to note discomfort along the left lateral glute and lateral calf. He reports discomfort when " turning in bed. He was able to golf and play tennis within the past week without any discomfort during or worsening of symptoms after. He remains quite active but is worried about exacerbating his condition.       Objective   Review of Systems   Constitutional:  Negative for activity change.   Cardiovascular:  Negative for leg swelling.   Gastrointestinal:  Negative for nausea and vomiting.   Genitourinary:  Negative for difficulty urinating.   Musculoskeletal:  Positive for back pain.   Neurological:  Negative for dizziness and numbness.   All other systems reviewed and are negative.    Physical Exam  Musculoskeletal:        Legs:         Spine Musculoskeletal Exam    Palpation    Thoracolumbar    Right      Muscle tone: normal    Left      Muscle tone: increased    Range of Motion    Thoracolumbar       Flexion: 50%. Flexion detail: guarding.     Extension: 50%. Extension detail: guarding.       Right      Lateral bending: normal. Lateral bending detail: no pain.       Lateral rotation: normal. Lateral rotation detail: no pain.       Left      Lateral bendin%. Lateral bending detail: guarding.       Lateral rotation: normal. Lateral rotation detail: no pain.      Sensory    Thoracolumbar      Right      Lateral leg: normal      Left      Lateral leg: normal    Special Tests    Spine special tests additional comments: - Heel walk  - Toe walk     Other Orthopedic Tests:  Thoracic/Lumbar/Sacrum: Heel Walk negative.  Toe Walk negative.  Segmental Joint(s): Segmental joint dysfunction was assessed with motion palpation and is identified in the following areas:  Lumbopelvic / Sacral SIJ : L3, L4, L5/S1, and L SIJ    Reviewed patient's home mobility/stretching program (in agreement to continue):  - Supine pelvic tilts and mobility  - Cat-Cow  - Single-leg curl in  - Gluteal bridging  - Child's Pose  - Prone extension and press-ups (to tolerance)      Assessment/Plan   Today's Treatment Included: Chiropractic  manipulation to the  Segmental Joint(s) Lumbopelvic/Sacral SIJ : L3, L4, L5/S1, and L SIJ    Treatment Techniques Used : Direct Non-force technique and Flexion-distraction technique  Neuromuscular Re-Education (85925): Start time: 8:30 am End time: 8:45 am  12 Units  Integrative Dry Needling (IDN) - Needles in / out:  8.  IDN:     Soft-tissue mobilization was performed in the following areas:       Lumbar Paraspinal mm. left, Quadratus Lumborum left, Obliques left, Gluteal mm. Glute. Max. , Glute. Med., and Glute Min.  left, and Piriformis left            Reviewed patient's home mobility/stretching program (previously directed in years past - I am in agreement to continue):  - Supine pelvic tilts and mobility  - Cat-Cow  - Single-leg curl in  - Gluteal bridging  - Child's Pose  - Prone extension and press-ups (to tolerance)    Discussed that the patient should continue daily mobility/stretching program as reviewed. He can continue physical activities, as these have not seemed to provoke or worsen pain (tennis, golf, etc).     Updated lumbar MRI ordered placed (>4 years since last MRI; return of symptoms; quality of care and consideration of injections)    Treatment Plan: The patient and I discussed the risks and benefits of Chiropractic Care.  Consent for care was given both written and orally by the patient.  Based on the patient's subjective complaints along with the examination findings, it is advised that a course of Chiropractic Treatment by initiated in the form of:   Chiropractic Manipulation (CMT)  Neuro-Muscular Re-education  Integrative Dry Needing (IDN)  Chiropractic treatment recommended at a frequency of 1 times per week for 3 weeks  until symptoms are mild or manageable  in conjunction with other providers and treatment modalities  The goals of the treatment will be to: reduce leg pain, return to athletic performance, and increase functional capacity  The patient tolerated today's treatment with little  or no additional discomfort and was instructed to contact the office for questions or concerns.   Follow up as scheduled.

## 2024-07-02 ENCOUNTER — TELEPHONE (OUTPATIENT)
Dept: PRIMARY CARE | Facility: CLINIC | Age: 64
End: 2024-07-02
Payer: COMMERCIAL

## 2024-07-02 NOTE — TELEPHONE ENCOUNTER
Pt is calling in regards to his Sciatica pain, Pt states he is good to talk until noon today - MEDARDO

## 2024-07-09 ENCOUNTER — APPOINTMENT (OUTPATIENT)
Dept: PAIN MEDICINE | Facility: HOSPITAL | Age: 64
End: 2024-07-09
Payer: COMMERCIAL

## 2024-07-09 DIAGNOSIS — M54.16 LUMBAR RADICULOPATHY: ICD-10-CM

## 2024-07-12 ENCOUNTER — APPOINTMENT (OUTPATIENT)
Dept: INTEGRATIVE MEDICINE | Facility: CLINIC | Age: 64
End: 2024-07-12
Payer: COMMERCIAL

## 2024-07-12 DIAGNOSIS — M54.16 LEFT LUMBAR RADICULOPATHY: Primary | ICD-10-CM

## 2024-07-12 DIAGNOSIS — M99.04 SEGMENTAL AND SOMATIC DYSFUNCTION OF SACRAL REGION: ICD-10-CM

## 2024-07-12 DIAGNOSIS — M48.062 SPINAL STENOSIS OF LUMBAR REGION WITH NEUROGENIC CLAUDICATION: ICD-10-CM

## 2024-07-12 DIAGNOSIS — M99.03 SEGMENTAL AND SOMATIC DYSFUNCTION OF LUMBAR REGION: ICD-10-CM

## 2024-07-12 DIAGNOSIS — M99.05 SEGMENTAL AND SOMATIC DYSFUNCTION OF PELVIC REGION: ICD-10-CM

## 2024-07-12 PROCEDURE — 97112 NEUROMUSCULAR REEDUCATION: CPT | Performed by: CHIROPRACTOR

## 2024-07-12 PROCEDURE — 98941 CHIROPRACT MANJ 3-4 REGIONS: CPT | Performed by: CHIROPRACTOR

## 2024-07-12 NOTE — PROGRESS NOTES
"Subjective   Patient ID: Luis Ureña is a 64 y.o. male who presents July 12, 2024 for chiropractic care.    2/12 [facility]    HPI : Luis returns to my office for chiropractic care. He reports no worsening of symptoms since the time of his initial treatment. He has continued to play golf and tennis without exacerbation. He reports that when he takes OTC meds, symptoms completely resolve. Without it, he continues to note discomfort stemming from the left gluteal region into the left lateral calf. Calf discomfort has been waking him at night since ending the steroid. He notes pain with turning in bed and transitions. Feels soft tissue work and deep massage is helpful. Scheduled for lumbar injection on 07/22 with pain management. Denies other changes to health.    ___________________________________________________________________________  INITIAL EXAM (07/01/2024):  Luis (\"Bill\"Ana Ureña presents to my office for chiropractic consultation and evaluation. He arrives with a main complaint of left gluteal and leg pain. He recalls initial onset of left lower back and leg pain following a disc extrusion with left-sided nerve root impingement at the L4-5 level in 2019. He received a series of 3 lumbar injections through pain management. He also worked with a medical yoga practitioner who helped him to develop an home mobility/stretching program that he uses to this day. He states that symptoms improved following these treatments in 2019 and he was pain-free until about 2 months ago. He states that he began to experience discomfort, without triggering incident, from the left lateral buttock region extending down the lateral leg to the lateral calf and shin. He describes lateral gluteal pain as occasionally sharp. He denies numbness, tingling. Denies bowel/bladder changes. He was prescribed a tapered steroid and then another stronger steroid by his PCP that seemed to help quite a bit. He recently finished his prescribed " steroid 2 days ago and symptoms remain improved. He does continue to note discomfort along the left lateral glute and lateral calf. He reports discomfort when turning in bed. He was able to golf and play tennis within the past week without any discomfort during or worsening of symptoms after. He remains quite active but is worried about exacerbating his condition.     Objective   Physical Exam  Neurological:      General: No focal deficit present.      Mental Status: He is alert and oriented to person, place, and time.      Cranial Nerves: No dysarthria or facial asymmetry.      Sensory: Sensation is intact.      Motor: Motor function is intact.      Coordination: Coordination is intact.      Gait: Gait is intact.         Palpation of the following region(s) revealed:      Lumbar: Lumbar paraspinals left, muscular hypertonicity.  Quadratus lumborum left, muscular hypertonicity.  Oblique's left, muscular hypertonicity.  Gluteal left, hypertonicity and tenderness.  Piriformis left, hypertonicity and tenderness.        Segmental Joint(s): Segmental joint dysfunction was assessed with motion palpation and is identified in the following areas:  Lumbopelvic / Sacral SIJ : L3, L4, L5/S1, and L SIJ      Assessment/Plan   Today's Treatment Included: Chiropractic manipulation to the  Segmental Joint(s) Lumbopelvic/Sacral SIJ : L3, L4, L5/S1, and L SIJ    Treatment Techniques Used : Pelvic drop table technique and Flexion-distraction technique  Neuromuscular Re-Education (92381): Start time: 8:00 am End time: 8:15 am  1 Units  Integrative Dry Needling (IDN) - Needles in / out:  8.    SL ART release to L QL and glute med  IC to L glute med prone    Soft-tissue mobilization was performed in the following areas:       Lumbar Paraspinal mm. left, Quadratus Lumborum left, Obliques left, Gluteal mm. Glute. Med. left, and Piriformis left            F/U in 1 week - scheduled for injection 07/22 with Dr Pineda    The patient  tolerated today's treatment with little or no additional discomfort and was instructed to contact the office for questions or concerns.

## 2024-07-18 ENCOUNTER — APPOINTMENT (OUTPATIENT)
Dept: INTEGRATIVE MEDICINE | Facility: CLINIC | Age: 64
End: 2024-07-18
Payer: COMMERCIAL

## 2024-07-19 ENCOUNTER — APPOINTMENT (OUTPATIENT)
Dept: PRIMARY CARE | Facility: CLINIC | Age: 64
End: 2024-07-19
Payer: COMMERCIAL

## 2024-07-19 VITALS
DIASTOLIC BLOOD PRESSURE: 74 MMHG | WEIGHT: 196 LBS | OXYGEN SATURATION: 99 % | HEIGHT: 73 IN | HEART RATE: 67 BPM | SYSTOLIC BLOOD PRESSURE: 110 MMHG | BODY MASS INDEX: 25.98 KG/M2

## 2024-07-19 DIAGNOSIS — E55.9 VITAMIN D DEFICIENCY: ICD-10-CM

## 2024-07-19 DIAGNOSIS — E78.2 MIXED HYPERLIPIDEMIA: Primary | ICD-10-CM

## 2024-07-19 DIAGNOSIS — Z81.8 FAMILY HISTORY OF DEMENTIA: ICD-10-CM

## 2024-07-19 DIAGNOSIS — M48.062 SPINAL STENOSIS OF LUMBAR REGION WITH NEUROGENIC CLAUDICATION: ICD-10-CM

## 2024-07-19 DIAGNOSIS — R97.20 ELEVATED PSA: ICD-10-CM

## 2024-07-19 PROBLEM — M48.061 LUMBAR STENOSIS: Status: ACTIVE | Noted: 2024-07-19

## 2024-07-19 ASSESSMENT — ENCOUNTER SYMPTOMS
CONFUSION: 0
COUGH: 0
WHEEZING: 0
WEAKNESS: 0
JOINT SWELLING: 0
CONSTIPATION: 0
NERVOUS/ANXIOUS: 0
CHILLS: 0
LIGHT-HEADEDNESS: 0
HEADACHES: 0
SLEEP DISTURBANCE: 0
DIARRHEA: 0
ADENOPATHY: 0
SINUS PAIN: 0
DYSURIA: 0
POLYDIPSIA: 0
MYALGIAS: 0
BRUISES/BLEEDS EASILY: 0
ACTIVITY CHANGE: 0
FEVER: 0
UNEXPECTED WEIGHT CHANGE: 0
ABDOMINAL DISTENTION: 0
HEMATURIA: 0
SINUS PRESSURE: 0
VOMITING: 0
TROUBLE SWALLOWING: 0
SORE THROAT: 0
RHINORRHEA: 0
CHEST TIGHTNESS: 0
CONSTITUTIONAL NEGATIVE: 1
FATIGUE: 0
DIZZINESS: 0
HYPERACTIVE: 0
DYSPHORIC MOOD: 0
FREQUENCY: 0
BLOOD IN STOOL: 0
ABDOMINAL PAIN: 0
ARTHRALGIAS: 0
FACIAL SWELLING: 0
SHORTNESS OF BREATH: 0
NAUSEA: 0
NECK STIFFNESS: 0
NUMBNESS: 0
PALPITATIONS: 0
BACK PAIN: 0

## 2024-07-19 NOTE — PROGRESS NOTES
Luis Ureña       Patient is here for a complete physical and general checkup.  He feels generally quite well without any major complaints.  He has a couple of ongoing issues.  1.  He continues to struggle with his lumbar stenosis.  He has had a couple of epidural steroid injections which have given him fairly good relief of symptoms.  He is in an aggressive stretching and strengthening program as well as he sees a chiropractor on an ongoing basis.  With all of this he manages to maintain his symptoms fairly well with periodic doses of anti-inflammatories which he takes when the pain flares up.  He is scheduled to get 1 more injection in the near future.  2.  He has been doing a very good job with his weight maintaining at a fairly good level.  He does watch his diet very carefully he drinks 1-2 drinks daily.  He does eat occasional meat products.  His lipids are slightly above target.  He did have a cardiac calcium score of 0 many years ago and he does not have a family history of heart disease.  3.  Patient exercises vigorously on a regular basis he does a lot of work with a  and keeps himself very physically fit he does a lot of core work Pilates and yoga.  Other than his back he has done quite well.           Review of Systems   Constitutional: Negative.  Negative for activity change, chills, fatigue, fever and unexpected weight change.   HENT:  Negative for dental problem, ear pain, facial swelling, hearing loss, mouth sores, rhinorrhea, sinus pressure, sinus pain, sore throat, tinnitus and trouble swallowing.    Eyes:  Negative for visual disturbance.        Bilat cataract    Respiratory:  Negative for cough, chest tightness, shortness of breath and wheezing.    Cardiovascular:  Negative for chest pain, palpitations and leg swelling.   Gastrointestinal:  Negative for abdominal distention, abdominal pain, blood in stool, constipation, diarrhea, nausea and vomiting.   Endocrine: Negative for cold  intolerance, heat intolerance, polydipsia and polyuria.   Genitourinary:  Negative for dysuria, frequency, hematuria and urgency.   Musculoskeletal:  Negative for arthralgias, back pain, joint swelling, myalgias and neck stiffness.   Skin:  Negative for rash.   Allergic/Immunologic: Negative for food allergies.   Neurological:  Negative for dizziness, weakness, light-headedness, numbness and headaches.   Hematological:  Negative for adenopathy. Does not bruise/bleed easily.   Psychiatric/Behavioral:  Negative for confusion, dysphoric mood and sleep disturbance. The patient is not nervous/anxious and is not hyperactive.           Objective      Visit Vitals  Smoking Status Never        Physical Exam  Constitutional:       Appearance: Normal appearance. He is normal weight.   HENT:      Head: Normocephalic.      Right Ear: Tympanic membrane and ear canal normal.      Left Ear: Tympanic membrane and ear canal normal.      Nose: Nose normal.      Mouth/Throat:      Pharynx: Oropharynx is clear.   Eyes:      Extraocular Movements: Extraocular movements intact.      Conjunctiva/sclera: Conjunctivae normal.      Pupils: Pupils are equal, round, and reactive to light.   Neck:      Thyroid: No thyroid mass or thyromegaly.      Vascular: No carotid bruit or JVD.   Cardiovascular:      Rate and Rhythm: Normal rate and regular rhythm.      Pulses: Normal pulses.           Carotid pulses are 2+ on the right side and 2+ on the left side.       Radial pulses are 2+ on the right side and 2+ on the left side.        Femoral pulses are 2+ on the right side and 2+ on the left side.       Popliteal pulses are 2+ on the right side and 2+ on the left side.        Dorsalis pedis pulses are 2+ on the right side and 2+ on the left side.        Posterior tibial pulses are 2+ on the right side and 2+ on the left side.      Heart sounds: Normal heart sounds. No murmur heard.  Pulmonary:      Effort: Pulmonary effort is normal.      Breath  sounds: Normal breath sounds.   Abdominal:      General: Abdomen is flat. Bowel sounds are normal. There is no distension.      Palpations: Abdomen is soft. There is no mass.      Tenderness: There is no guarding or rebound.   Genitourinary:     Penis: Normal.       Prostate: Normal.      Rectum: Normal.   Musculoskeletal:         General: Normal range of motion.      Cervical back: Normal range of motion and neck supple.      Right lower leg: No edema.      Left lower leg: No edema.   Lymphadenopathy:      Cervical: No cervical adenopathy.   Skin:     General: Skin is warm and dry.      Findings: No lesion or rash.   Neurological:      General: No focal deficit present.      Mental Status: He is alert and oriented to person, place, and time.   Psychiatric:         Mood and Affect: Mood normal.            Assess/Plan SmartLinks:   Problem List Items Addressed This Visit             ICD-10-CM    Elevated PSA R97.20     PSA remains unchanged.  Patient remains asymptomatic from BPH's standpoint.         Vitamin D deficiency E55.9     Vitamin D levels are therapeutic         Mixed hyperlipidemia - Primary E78.2     Patient continues to watch his diet carefully his lipids are slightly above target but will hold off on starting a statin at this point.  He will continue to work on his diet and exercise program to see if we can reduce his lipids further.  With his lack of family history and his negative calcium score I think he is low risk at this point.         Family history of dementia Z81.8     Patient has a strong family history of dementia.  He wonders about genetic testing talked about the pros and cons of this.  He will consider it and we may consider genetic testing in the future should the results of it and the reliability of a become better as time goes on.         Lumbar stenosis M48.061      Patient will continue to follow-up with pain management.  He will continue his aggressive core strengthening and  stretching program which is quite effective he can also take as needed anti-inflammatories.

## 2024-07-19 NOTE — ASSESSMENT & PLAN NOTE
Patient will continue to follow-up with pain management.  He will continue his aggressive core strengthening and stretching program which is quite effective he can also take as needed anti-inflammatories.  
PSA remains unchanged.  Patient remains asymptomatic from BPH's standpoint.  
Patient continues to watch his diet carefully his lipids are slightly above target but will hold off on starting a statin at this point.  He will continue to work on his diet and exercise program to see if we can reduce his lipids further.  With his lack of family history and his negative calcium score I think he is low risk at this point.  
Patient has a strong family history of dementia.  He wonders about genetic testing talked about the pros and cons of this.  He will consider it and we may consider genetic testing in the future should the results of it and the reliability of a become better as time goes on.  
Vitamin D levels are therapeutic  
No

## 2024-07-22 ENCOUNTER — APPOINTMENT (OUTPATIENT)
Dept: RADIOLOGY | Facility: HOSPITAL | Age: 64
End: 2024-07-22
Payer: COMMERCIAL

## 2024-11-18 DIAGNOSIS — K21.9 GASTROESOPHAGEAL REFLUX DISEASE WITHOUT ESOPHAGITIS: ICD-10-CM

## 2024-11-18 RX ORDER — PANTOPRAZOLE SODIUM 40 MG/1
40 TABLET, DELAYED RELEASE ORAL DAILY
Qty: 90 TABLET | Refills: 1 | Status: SHIPPED | OUTPATIENT
Start: 2024-11-18 | End: 2024-11-25 | Stop reason: SDUPTHER

## 2024-11-25 RX ORDER — PANTOPRAZOLE SODIUM 40 MG/1
40 TABLET, DELAYED RELEASE ORAL DAILY
Qty: 90 TABLET | Refills: 1 | Status: SHIPPED | OUTPATIENT
Start: 2024-11-25

## 2025-02-03 ENCOUNTER — OFFICE VISIT (OUTPATIENT)
Dept: PRIMARY CARE | Facility: CLINIC | Age: 65
End: 2025-02-03
Payer: COMMERCIAL

## 2025-02-03 VITALS
OXYGEN SATURATION: 98 % | HEART RATE: 55 BPM | DIASTOLIC BLOOD PRESSURE: 72 MMHG | TEMPERATURE: 98.3 F | SYSTOLIC BLOOD PRESSURE: 124 MMHG

## 2025-02-03 DIAGNOSIS — R05.2 SUBACUTE COUGH: Primary | ICD-10-CM

## 2025-02-03 DIAGNOSIS — M54.32 SCIATICA OF LEFT SIDE: ICD-10-CM

## 2025-02-03 PROCEDURE — 99214 OFFICE O/P EST MOD 30 MIN: CPT | Performed by: FAMILY MEDICINE

## 2025-02-03 RX ORDER — PREDNISONE 10 MG/1
TABLET ORAL
Qty: 30 TABLET | Refills: 0 | Status: SHIPPED | OUTPATIENT
Start: 2025-02-03 | End: 2025-02-13

## 2025-02-03 ASSESSMENT — PAIN SCALES - GENERAL: PAINLEVEL_OUTOF10: 0-NO PAIN

## 2025-02-03 NOTE — PROGRESS NOTES
"Subjective   Patient ID: Luis Ureña is a 65 y.o. male who presents for Cough, Headache, and Sciatica.  HPI  Started when travelling in [place]   20th of January   Forceful drier cough   Was put on Azithromycin and Prednisone - helped a little   Then 4 days of another antibiotic   Better but has not shaken in it   Waxing / waning  Mucinex helps   Also has issues with reflux  Does seem to have an element of dryness/throat irritation that may be the real origin of the cough  Has not tried increasing his GERD medication    Periodically gets a flare of sciatica and has now  Feels in his left [place] and occasionally can feel something in the left lateral calf -similar to what he has had before  No leg weakness   No antecedent trauma noted, no bowel or bladder incontinence  Earlier this year he took a larger dosing taper of prednisone which really \"knocked it out\"  Tolerated that well      Review of Systems  Essentially noncontributory otherwise    Objective   /72 (BP Location: Left arm, Patient Position: Sitting, BP Cuff Size: Adult)   Pulse 55   Temp 36.8 °C (98.3 °F) (Temporal)   SpO2 98%     Physical Exam  General: No acute distress, appears relatively well, wearing a mask  HEENT: no conjunctival injection or crusting, TMs normal bilaterally, oropharynx clear without significant lesions or erythema  Neck: No gross ACC lymphadenopathy  Lungs: Clear to auscultation bilaterally no wheeze or crackles,but semifrequent cough that seems to be more in the upper chest/airway  Cardiac: Regular rate and rhythm, normal S1-S2  Back: no gross deformity does not seem to favor any particular position  Extremities: No gait abnormality and no obvious strength deficit bilateral lower extremities  Assessment/Plan   Problem List Items Addressed This Visit    None  Visit Diagnoses       Subacute cough    -  Primary    Relevant Medications    predniSONE (Deltasone) 10 mg tablet    Sciatica of left side        Relevant Medications "    predniSONE (Deltasone) 10 mg tablet        1) question if this may be more of a reflux cough and consider taking pantoprazole twice daily for at least the next 3 days - can continue use of NyQuil at bedtime and daytime use of Mucinex if indeed feels that is beneficial to him - consider that the prednisone may indeed help reduce a cough-equivalent-asthma (states he has had a history of significant environmental allergies in the past has received a Kenalog shot this time of year with some benefit)    2) history of sciatica -did not discourage from any activity, discussed that he could taper off the prednisone faster than it is written and if he had any questions or concerns to certainly call the office for consultation about how to approach that     2)

## 2025-02-19 ENCOUNTER — TELEPHONE (OUTPATIENT)
Dept: PRIMARY CARE | Facility: CLINIC | Age: 65
End: 2025-02-19
Payer: COMMERCIAL

## 2025-03-10 ENCOUNTER — APPOINTMENT (OUTPATIENT)
Dept: PRIMARY CARE | Facility: CLINIC | Age: 65
End: 2025-03-10
Payer: COMMERCIAL

## 2025-03-10 ENCOUNTER — HOSPITAL ENCOUNTER (OUTPATIENT)
Dept: RADIOLOGY | Facility: HOSPITAL | Age: 65
Discharge: HOME | End: 2025-03-10
Payer: COMMERCIAL

## 2025-03-10 VITALS — HEART RATE: 70 BPM | DIASTOLIC BLOOD PRESSURE: 72 MMHG | OXYGEN SATURATION: 98 % | SYSTOLIC BLOOD PRESSURE: 118 MMHG

## 2025-03-10 DIAGNOSIS — M19.90 ARTHRITIS: ICD-10-CM

## 2025-03-10 DIAGNOSIS — R05.3 CHRONIC COUGH: ICD-10-CM

## 2025-03-10 DIAGNOSIS — R97.20 ELEVATED PSA: Primary | ICD-10-CM

## 2025-03-10 DIAGNOSIS — M48.062 SPINAL STENOSIS OF LUMBAR REGION WITH NEUROGENIC CLAUDICATION: ICD-10-CM

## 2025-03-10 DIAGNOSIS — R97.20 ELEVATED PSA: ICD-10-CM

## 2025-03-10 PROCEDURE — 71046 X-RAY EXAM CHEST 2 VIEWS: CPT | Performed by: RADIOLOGY

## 2025-03-10 PROCEDURE — 99213 OFFICE O/P EST LOW 20 MIN: CPT | Performed by: INTERNAL MEDICINE

## 2025-03-10 PROCEDURE — 71046 X-RAY EXAM CHEST 2 VIEWS: CPT

## 2025-03-10 RX ORDER — MELOXICAM 15 MG/1
15 TABLET ORAL DAILY
Qty: 30 TABLET | Refills: 11 | Status: SHIPPED | OUTPATIENT
Start: 2025-03-10 | End: 2026-03-10

## 2025-03-10 NOTE — ASSESSMENT & PLAN NOTE
Chronic dry cough possibly due to some mild GERD symptoms.  Will increase his pantoprazole to twice daily.  Will check a chest x-ray to rule out any pulmonary process patient smoked heavily for about 10 years but quit 25 years ago.

## 2025-03-10 NOTE — ASSESSMENT & PLAN NOTE
Sciatica symptoms are mostly improved.  Will have him get a chain and strengthening exercises.  Will start him on meloxicam 15 mg daily.

## 2025-03-10 NOTE — PROGRESS NOTES
Subjective   Patient ID: Luis Ureña is a 65 y.o. male who presents for URI.    Cough L sciatica urine sprain R MCL   Patient is here for several complaints.  1.  He continues to have a mild dry cough.  His cough is now been present for about 8 weeks since he returned from a trip to [place].  He has had a couple courses of steroids as well as a couple courses of antibiotics.  The cough is mostly gone but he still has a nagging tickling irritative cough.  He wonders if it may be due to some GERD as well.  He denies any fever chills night sweats or weight loss he has used inhalers in the past but has found it does actually make his cough worse.  He has used benzonatate previously with no success.  He has not had a chest x-ray  2.  He has slightly increased urinary frequency over the last several months.  He also has some increased nocturia as well.  Denies any polyuria or polydipsia has had no weight loss no prior history of diabetes.  3.  Set of flareup of his left leg sciatica which she has had off-and-on over the years.  He has gotten about 80% relief from his current treatment program.  4.  He is recently sprained his right MCL while skiing that is slowly healing up.    URI          Review of Systems    Objective   /72   Pulse 70   SpO2 98%     Physical Exam  Cardiovascular:      Pulses: Normal pulses.      Heart sounds: Normal heart sounds.   Pulmonary:      Breath sounds: Normal breath sounds.   Musculoskeletal:      Right lower leg: No edema.      Left lower leg: No edema.      Comments: He has a  knee brace on his right knee.   Lymphadenopathy:      Cervical: Cervical adenopathy present.         Assessment/Plan   Problem List Items Addressed This Visit             ICD-10-CM    Elevated PSA - Primary R97.20     Increased urinary frequency.  He does not have any significant BPH symptoms however given the patient's history of elevated PSA in the past we will recheck a PSA today also check  urinalysis and chemistries to look for any signs of diabetes.  Otherwise he will be reassured.         Relevant Orders    XR chest 2 views    Urinalysis with Reflex Microscopic    Basic Metabolic Panel    PSA    Lumbar stenosis M48.061     Sciatica symptoms are mostly improved.  Will have him get a chain and strengthening exercises.  Will start him on meloxicam 15 mg daily.         Chronic cough R05.3     Chronic dry cough possibly due to some mild GERD symptoms.  Will increase his pantoprazole to twice daily.  Will check a chest x-ray to rule out any pulmonary process patient smoked heavily for about 10 years but quit 25 years ago.         Relevant Orders    XR chest 2 views    Urinalysis with Reflex Microscopic    Basic Metabolic Panel    Arthritis M19.90    Relevant Medications    meloxicam (Mobic) 15 mg tablet

## 2025-03-10 NOTE — ASSESSMENT & PLAN NOTE
Increased urinary frequency.  He does not have any significant BPH symptoms however given the patient's history of elevated PSA in the past we will recheck a PSA today also check urinalysis and chemistries to look for any signs of diabetes.  Otherwise he will be reassured.

## 2025-03-11 ENCOUNTER — TELEPHONE (OUTPATIENT)
Dept: PRIMARY CARE | Facility: CLINIC | Age: 65
End: 2025-03-11
Payer: COMMERCIAL

## 2025-03-11 DIAGNOSIS — K21.9 GASTROESOPHAGEAL REFLUX DISEASE WITHOUT ESOPHAGITIS: ICD-10-CM

## 2025-03-11 DIAGNOSIS — R45.89 ANXIETY ABOUT TREATMENT: Primary | ICD-10-CM

## 2025-03-11 LAB
ANION GAP SERPL CALCULATED.4IONS-SCNC: 12 MMOL/L (CALC) (ref 7–17)
APPEARANCE UR: CLEAR
BILIRUB UR QL STRIP: NEGATIVE
BUN SERPL-MCNC: 17 MG/DL (ref 7–25)
BUN/CREAT SERPL: NORMAL (CALC) (ref 6–22)
CALCIUM SERPL-MCNC: 9.3 MG/DL (ref 8.6–10.3)
CHLORIDE SERPL-SCNC: 109 MMOL/L (ref 98–110)
CO2 SERPL-SCNC: 24 MMOL/L (ref 20–32)
COLOR UR: YELLOW
CREAT SERPL-MCNC: 1.05 MG/DL (ref 0.7–1.35)
EGFRCR SERPLBLD CKD-EPI 2021: 80 ML/MIN/1.73M2
GLUCOSE SERPL-MCNC: 81 MG/DL (ref 65–99)
GLUCOSE UR QL STRIP: NEGATIVE
HGB UR QL STRIP: NEGATIVE
KETONES UR QL STRIP: NEGATIVE
LEUKOCYTE ESTERASE UR QL STRIP: NEGATIVE
NITRITE UR QL STRIP: NEGATIVE
PH UR STRIP: NORMAL [PH] (ref 5–8)
POTASSIUM SERPL-SCNC: 5.1 MMOL/L (ref 3.5–5.3)
PROT UR QL STRIP: NEGATIVE
PSA SERPL-MCNC: 3.13 NG/ML
SODIUM SERPL-SCNC: 145 MMOL/L (ref 135–146)
SP GR UR STRIP: 1.02 (ref 1–1.03)

## 2025-03-11 RX ORDER — PANTOPRAZOLE SODIUM 40 MG/1
40 TABLET, DELAYED RELEASE ORAL DAILY
Qty: 90 TABLET | Refills: 1 | Status: SHIPPED | OUTPATIENT
Start: 2025-03-11

## 2025-03-11 RX ORDER — AZITHROMYCIN 250 MG/1
TABLET, FILM COATED ORAL
Qty: 6 TABLET | Refills: 0 | Status: SHIPPED | OUTPATIENT
Start: 2025-03-11 | End: 2025-03-16

## 2025-03-11 NOTE — TELEPHONE ENCOUNTER
Needs refill for  pantoprazole (ProtoNix) 40 mg  also   [id number] - [place], [place] - [address]    [address], [place] [address]  Phone: [phone] Fax: [phone]

## 2025-03-18 RX ORDER — DIAZEPAM 5 MG/1
5 TABLET ORAL EVERY 6 HOURS PRN
Qty: 6 TABLET | Refills: 0 | Status: SHIPPED | OUTPATIENT
Start: 2025-03-18

## 2025-04-13 DIAGNOSIS — S89.91XA RIGHT KNEE INJURY, INITIAL ENCOUNTER: Primary | ICD-10-CM

## 2025-04-13 DIAGNOSIS — M25.561 RIGHT KNEE PAIN, UNSPECIFIED CHRONICITY: ICD-10-CM

## 2025-04-14 ENCOUNTER — TELEPHONE (OUTPATIENT)
Dept: ORTHOPEDIC SURGERY | Facility: HOSPITAL | Age: 65
End: 2025-04-14
Payer: COMMERCIAL

## 2025-04-14 NOTE — TELEPHONE ENCOUNTER
Called patient to let him know that MRI order was put in.  He was aware and had it scheduled for Wed 4/23 at [facility] at 10:30 am.  We moved his appointment with Dr. Brunner back to 11:30 am on 4/23/25 so that he could review the MRI with him. CT

## 2025-04-23 ENCOUNTER — APPOINTMENT (OUTPATIENT)
Dept: ORTHOPEDIC SURGERY | Facility: HOSPITAL | Age: 65
End: 2025-04-23
Payer: COMMERCIAL

## 2025-04-23 ENCOUNTER — OFFICE VISIT (OUTPATIENT)
Dept: ORTHOPEDIC SURGERY | Facility: HOSPITAL | Age: 65
End: 2025-04-23
Payer: COMMERCIAL

## 2025-04-23 ENCOUNTER — APPOINTMENT (OUTPATIENT)
Dept: RADIOLOGY | Facility: HOSPITAL | Age: 65
End: 2025-04-23
Payer: COMMERCIAL

## 2025-04-23 ENCOUNTER — HOSPITAL ENCOUNTER (OUTPATIENT)
Dept: RADIOLOGY | Facility: HOSPITAL | Age: 65
Discharge: HOME | End: 2025-04-23
Payer: COMMERCIAL

## 2025-04-23 VITALS — WEIGHT: 193 LBS | BODY MASS INDEX: 25.58 KG/M2 | HEIGHT: 73 IN

## 2025-04-23 DIAGNOSIS — M25.561 RIGHT KNEE PAIN, UNSPECIFIED CHRONICITY: ICD-10-CM

## 2025-04-23 DIAGNOSIS — S83.411A SPRAIN OF MEDIAL COLLATERAL LIGAMENT OF RIGHT KNEE, INITIAL ENCOUNTER: Primary | ICD-10-CM

## 2025-04-23 DIAGNOSIS — S89.91XA RIGHT KNEE INJURY, INITIAL ENCOUNTER: ICD-10-CM

## 2025-04-23 PROCEDURE — 99213 OFFICE O/P EST LOW 20 MIN: CPT | Performed by: ORTHOPAEDIC SURGERY

## 2025-04-23 PROCEDURE — 73564 X-RAY EXAM KNEE 4 OR MORE: CPT | Mod: RIGHT SIDE | Performed by: RADIOLOGY

## 2025-04-23 PROCEDURE — 73721 MRI JNT OF LWR EXTRE W/O DYE: CPT | Mod: RT

## 2025-04-23 PROCEDURE — L1833 KO ADJ JNT POS R SUP PRE OTS: HCPCS | Performed by: ORTHOPAEDIC SURGERY

## 2025-04-23 PROCEDURE — 3008F BODY MASS INDEX DOCD: CPT | Performed by: ORTHOPAEDIC SURGERY

## 2025-04-23 PROCEDURE — 73564 X-RAY EXAM KNEE 4 OR MORE: CPT | Mod: RT

## 2025-05-01 NOTE — PROGRESS NOTES
HPI  This is a pleasant 65 y.o. male here today for right knee pain.  Patient states that about 7 weeks [place] and he [place] an edge and developed some pain along the medial aspect of the right knee.  He comes to see me today further evaluation after undergoing an MRI scan        Medical History[1]    Surgical History[2]    Social History[3]        ROS  Reviewed and all pertinent positives mentioned in HPI.      EXAM  Patient is in no acute distress.  They are alert and oriented x3.  They are of normal mood and affect.  They are in no acute distress.  The patient's limb is warm and well-perfused.  They have intact sensation to light touch in all lower extremity dermatomes.  There is a minimal effusion.    The patient's quadriceps and hamstring strength is 5 of 5.    The patient can do a straight leg raise with no radicular pain.  negative lachmans. negative posterior drawer.  There is no patellofemoral crepitus.   The knee is stable to varus and valgus stress at both 0 and 30°.  Though he does have discomfort with stressing of the MCL.  there is tenderness along the medial joint line.  negative McMurrays      IMAGING  Imaging reviewed today reveal no gross fracture or dislocation.  Preserved joint spaces.  MRI reviewed reveals medial peripheral tear of the meniscus, MCL sprain grade 2.      ASSESSMENT/PLAN  Patient with right meniscus tear, MCL injury    We will have the patient initiate a course of physical therapy and continue NSAIDs and activity modification.  If symptoms persist, we will see them back for re-evaluation.    , Patient was prescribed a Playmaker for Meniscus tear and MCL sprain.  The patient is ambulatory with or without aid; but, has weakness, instability and/or deformity of their right knee which requires stabilization from this orthosis to improve their function.      Verbal and written instructions for the use, wear schedule, cleaning and application of this item were given.   Patient was instructed that should the brace result in increased pain, decreased sensation, increased swelling, or an overall worsening of their medical condition, to please contact our office immediately.     Orthotic management and training was provided for skin care, modifications due to healing tissues, edema changes, interruption in skin integrity, and safety precautions with the orthosis.           Bk Brunner MD        [1]   Past Medical History:  Diagnosis Date    Other specified health status     No pertinent past medical history   [2] No past surgical history on file.  [3]   Social History  Tobacco Use    Smoking status: Never    Smokeless tobacco: Never   Substance Use Topics    Alcohol use: Never    Drug use: Never

## 2025-05-12 ENCOUNTER — TELEPHONE (OUTPATIENT)
Dept: PRIMARY CARE | Facility: CLINIC | Age: 65
End: 2025-05-12
Payer: COMMERCIAL

## 2025-05-12 DIAGNOSIS — Z00.00 ANNUAL PHYSICAL EXAM: ICD-10-CM

## 2025-05-12 DIAGNOSIS — M19.90 ARTHRITIS: Primary | ICD-10-CM

## 2025-05-12 NOTE — TELEPHONE ENCOUNTER
Patient is on his way to [place] at the end of the week for 3 weeks. He is requesting prescriptions for travel.

## 2025-05-13 RX ORDER — METHYLPREDNISOLONE 4 MG/1
TABLET ORAL
Qty: 21 TABLET | Refills: 0 | Status: SHIPPED | OUTPATIENT
Start: 2025-05-13 | End: 2025-05-19

## 2025-05-13 RX ORDER — AZITHROMYCIN 250 MG/1
TABLET, FILM COATED ORAL
Qty: 6 TABLET | Refills: 0 | Status: SHIPPED | OUTPATIENT
Start: 2025-05-13 | End: 2025-05-18

## 2025-05-16 LAB
25(OH)D3+25(OH)D2 SERPL-MCNC: 29 NG/ML (ref 30–100)
ALBUMIN SERPL-MCNC: 4.2 G/DL (ref 3.6–5.1)
ALP SERPL-CCNC: 56 U/L (ref 35–144)
ALT SERPL-CCNC: 26 U/L (ref 9–46)
ANION GAP SERPL CALCULATED.4IONS-SCNC: 8 MMOL/L (CALC) (ref 7–17)
APPEARANCE UR: CLEAR
AST SERPL-CCNC: 31 U/L (ref 10–35)
BASOPHILS # BLD AUTO: 58 CELLS/UL (ref 0–200)
BASOPHILS NFR BLD AUTO: 1 %
BILIRUB SERPL-MCNC: 0.5 MG/DL (ref 0.2–1.2)
BILIRUB UR QL STRIP: NEGATIVE
BUN SERPL-MCNC: 18 MG/DL (ref 7–25)
CALCIUM SERPL-MCNC: 9.1 MG/DL (ref 8.6–10.3)
CHLORIDE SERPL-SCNC: 108 MMOL/L (ref 98–110)
CO2 SERPL-SCNC: 26 MMOL/L (ref 20–32)
COLOR UR: YELLOW
CREAT SERPL-MCNC: 1.15 MG/DL (ref 0.7–1.35)
EGFRCR SERPLBLD CKD-EPI 2021: 72 ML/MIN/1.73M2
EOSINOPHIL # BLD AUTO: 232 CELLS/UL (ref 15–500)
EOSINOPHIL NFR BLD AUTO: 4 %
ERYTHROCYTE [DISTWIDTH] IN BLOOD BY AUTOMATED COUNT: 13 % (ref 11–15)
EST. AVERAGE GLUCOSE BLD GHB EST-MCNC: 111 MG/DL
EST. AVERAGE GLUCOSE BLD GHB EST-SCNC: 6.2 MMOL/L
GLUCOSE SERPL-MCNC: 97 MG/DL (ref 65–99)
GLUCOSE UR QL STRIP: NEGATIVE
HBA1C MFR BLD: 5.5 %
HCT VFR BLD AUTO: 47.1 % (ref 38.5–50)
HGB BLD-MCNC: 15 G/DL (ref 13.2–17.1)
HGB UR QL STRIP: NEGATIVE
KETONES UR QL STRIP: NEGATIVE
LEUKOCYTE ESTERASE UR QL STRIP: NEGATIVE
LYMPHOCYTES # BLD AUTO: 1926 CELLS/UL (ref 850–3900)
LYMPHOCYTES NFR BLD AUTO: 33.2 %
MCH RBC QN AUTO: 30.7 PG (ref 27–33)
MCHC RBC AUTO-ENTMCNC: 31.8 G/DL (ref 32–36)
MCV RBC AUTO: 96.5 FL (ref 80–100)
MONOCYTES # BLD AUTO: 713 CELLS/UL (ref 200–950)
MONOCYTES NFR BLD AUTO: 12.3 %
NEUTROPHILS # BLD AUTO: 2871 CELLS/UL (ref 1500–7800)
NEUTROPHILS NFR BLD AUTO: 49.5 %
NFL CHAIN SERPL IA-MCNC: NORMAL PG/ML
NITRITE UR QL STRIP: NEGATIVE
PH UR STRIP: 5.5 [PH] (ref 5–8)
PLATELET # BLD AUTO: 241 THOUSAND/UL (ref 140–400)
PMV BLD REES-ECKER: 11.2 FL (ref 7.5–12.5)
POTASSIUM SERPL-SCNC: 4.6 MMOL/L (ref 3.5–5.3)
PROT SERPL-MCNC: 6.8 G/DL (ref 6.1–8.1)
PROT UR QL STRIP: NEGATIVE
PSA SERPL-MCNC: 2.99 NG/ML
RBC # BLD AUTO: 4.88 MILLION/UL (ref 4.2–5.8)
SODIUM SERPL-SCNC: 142 MMOL/L (ref 135–146)
SP GR UR STRIP: 1.02 (ref 1–1.03)
TSH SERPL-ACNC: 1.72 MIU/L (ref 0.4–4.5)
WBC # BLD AUTO: 5.8 THOUSAND/UL (ref 3.8–10.8)

## 2025-05-20 LAB
25(OH)D3+25(OH)D2 SERPL-MCNC: 29 NG/ML (ref 30–100)
ALBUMIN SERPL-MCNC: 4.2 G/DL (ref 3.6–5.1)
ALP SERPL-CCNC: 56 U/L (ref 35–144)
ALT SERPL-CCNC: 26 U/L (ref 9–46)
ANION GAP SERPL CALCULATED.4IONS-SCNC: 8 MMOL/L (CALC) (ref 7–17)
APPEARANCE UR: CLEAR
AST SERPL-CCNC: 31 U/L (ref 10–35)
BASOPHILS # BLD AUTO: 58 CELLS/UL (ref 0–200)
BASOPHILS NFR BLD AUTO: 1 %
BILIRUB SERPL-MCNC: 0.5 MG/DL (ref 0.2–1.2)
BILIRUB UR QL STRIP: NEGATIVE
BUN SERPL-MCNC: 18 MG/DL (ref 7–25)
CALCIUM SERPL-MCNC: 9.1 MG/DL (ref 8.6–10.3)
CHLORIDE SERPL-SCNC: 108 MMOL/L (ref 98–110)
CO2 SERPL-SCNC: 26 MMOL/L (ref 20–32)
COLOR UR: YELLOW
CREAT SERPL-MCNC: 1.15 MG/DL (ref 0.7–1.35)
EGFRCR SERPLBLD CKD-EPI 2021: 72 ML/MIN/1.73M2
EOSINOPHIL # BLD AUTO: 232 CELLS/UL (ref 15–500)
EOSINOPHIL NFR BLD AUTO: 4 %
ERYTHROCYTE [DISTWIDTH] IN BLOOD BY AUTOMATED COUNT: 13 % (ref 11–15)
EST. AVERAGE GLUCOSE BLD GHB EST-MCNC: 111 MG/DL
EST. AVERAGE GLUCOSE BLD GHB EST-SCNC: 6.2 MMOL/L
GLUCOSE SERPL-MCNC: 97 MG/DL (ref 65–99)
GLUCOSE UR QL STRIP: NEGATIVE
HBA1C MFR BLD: 5.5 %
HCT VFR BLD AUTO: 47.1 % (ref 38.5–50)
HGB BLD-MCNC: 15 G/DL (ref 13.2–17.1)
HGB UR QL STRIP: NEGATIVE
KETONES UR QL STRIP: NEGATIVE
LEUKOCYTE ESTERASE UR QL STRIP: NEGATIVE
LYMPHOCYTES # BLD AUTO: 1926 CELLS/UL (ref 850–3900)
LYMPHOCYTES NFR BLD AUTO: 33.2 %
MCH RBC QN AUTO: 30.7 PG (ref 27–33)
MCHC RBC AUTO-ENTMCNC: 31.8 G/DL (ref 32–36)
MCV RBC AUTO: 96.5 FL (ref 80–100)
MONOCYTES # BLD AUTO: 713 CELLS/UL (ref 200–950)
MONOCYTES NFR BLD AUTO: 12.3 %
NEUTROPHILS # BLD AUTO: 2871 CELLS/UL (ref 1500–7800)
NEUTROPHILS NFR BLD AUTO: 49.5 %
NFL CHAIN SERPL IA-MCNC: 3.39 PG/ML
NITRITE UR QL STRIP: NEGATIVE
PH UR STRIP: 5.5 [PH] (ref 5–8)
PLATELET # BLD AUTO: 241 THOUSAND/UL (ref 140–400)
PMV BLD REES-ECKER: 11.2 FL (ref 7.5–12.5)
POTASSIUM SERPL-SCNC: 4.6 MMOL/L (ref 3.5–5.3)
PROT SERPL-MCNC: 6.8 G/DL (ref 6.1–8.1)
PROT UR QL STRIP: NEGATIVE
PSA SERPL-MCNC: 2.99 NG/ML
RBC # BLD AUTO: 4.88 MILLION/UL (ref 4.2–5.8)
SODIUM SERPL-SCNC: 142 MMOL/L (ref 135–146)
SP GR UR STRIP: 1.02 (ref 1–1.03)
TSH SERPL-ACNC: 1.72 MIU/L (ref 0.4–4.5)
WBC # BLD AUTO: 5.8 THOUSAND/UL (ref 3.8–10.8)

## 2025-06-10 LAB
AL BETA40 PLAS-MCNC: 244 PG/ML
AL BETA42 PLAS-MCNC: 39 PG/ML
Lab: 0.16
Lab: 0.37
P-TAU217 SERPL IA-MCNC: 0.27 PG/ML
QUEST INTERPRETATION: ABNORMAL

## 2025-06-16 ENCOUNTER — HOSPITAL ENCOUNTER (OUTPATIENT)
Dept: RADIOLOGY | Facility: EXTERNAL LOCATION | Age: 65
Discharge: HOME | End: 2025-06-16
Payer: COMMERCIAL

## 2025-06-30 ENCOUNTER — OFFICE VISIT (OUTPATIENT)
Dept: ORTHOPEDIC SURGERY | Facility: HOSPITAL | Age: 65
End: 2025-06-30
Payer: COMMERCIAL

## 2025-06-30 DIAGNOSIS — M76.62 ACHILLES TENDINITIS OF LEFT LOWER EXTREMITY: Primary | ICD-10-CM

## 2025-06-30 PROCEDURE — 99212 OFFICE O/P EST SF 10 MIN: CPT | Performed by: EMERGENCY MEDICINE

## 2025-06-30 PROCEDURE — 99214 OFFICE O/P EST MOD 30 MIN: CPT | Performed by: EMERGENCY MEDICINE

## 2025-06-30 ASSESSMENT — PAIN SCALES - GENERAL: PAINLEVEL_OUTOF10: 1

## 2025-06-30 ASSESSMENT — PAIN - FUNCTIONAL ASSESSMENT: PAIN_FUNCTIONAL_ASSESSMENT: 0-10

## 2025-06-30 NOTE — PROGRESS NOTES
Subjective   Patient ID: Luis Ureña is a 65 y.o. male who presents for Pain of the Left Achilles Tendon.  History of Present Illness  The patient is a 65-year-old male who was referred by Dr. Ku for left posterior ankle pain, specifically to discuss the Tenex procedure for Achilles tendinopathy.    Approximately 15 years ago, he experienced a minor injury to his left ankle while playing tennis. Since then, he has been diligently performing stretching exercises as part of his physical therapy regimen. He describes the sensation as akin to Michelet's heel, with the Achilles tendon rubbing against it. Over the years, he has sought treatment at [facility] in [place] on several occasions. Approximately 6 months ago, he was informed that the surgical procedure had been improved, involving the shaving off of bone and reconnection of the Achilles tendon, reducing the recovery time from 10 to 8 months. Despite this, he remains active and continues to play tennis, although only doubles.    He has found relief from taking anti-inflammatories prior to playing tennis or walking team halls. He has also tried Voltaren gel without success and has attempted taping and stretching routines. His current medication regimen includes a prescription for 600 mg of anti-inflammatory drugs, which he takes as needed to maintain his activity levels. He has previously tried PRP injections at [facility], but these were unsuccessful. He is considering scheduling the Tenex procedure towards the end of 08/2025.    All other systems have been reviewed and are negative for complaint.      Objective     There were no vitals taken for this visit.     Physical Exam  - Musculoskeletal    - Left Ankle:      - No rashes or deformities      - Dorsiflexion limited to 5 degrees      - Plantar flexion 45 degrees      - Eversion 5 degrees      - Inversion 10 degrees      - Mild swelling at the Achilles insertion      - Exquisite tenderness to palpation at the  Achilles tendon insertion      - No fusiform swelling in the mid substance of the Achilles tendon      - Negative anterior drawer and talar tilt tests    Imaging:   Radiographs:   Radiographs reviewed from outside facility of the left ankle including AP, oblique, lateral: No acute fractures dislocations.  There is no visible fusiform swelling of the mid substance of the Achilles tendon.  There is a Michelet's deformity present.    1. Achilles tendinitis of left lower extremity  MR ankle left wo IV contrast          Assessment & Plan  1. Achilles tendinopathy  - Discomfort likely due to chronic tendinosis of the Achilles, characterized by scar tissue formation in the Achilles tendon  - Discussed potential benefits and risks of the Tenex procedure  - Considered combining Tenex procedure with PRP injection, but not recommended due to lack of supporting data  - Comprehensive discussion on Tenex procedure, including potential benefits and risks  - Procedure can be performed under local anesthesia with a recovery period of approximately 6 weeks  - Advised to avoid high-impact activities such as tennis for 6 weeks post-procedure, but can resume walking and golfing after 2 weeks  - MRI scan to be ordered to further evaluate the condition of the Achilles tendon insertion  - Advised to use Voltaren gel four times daily for optimal benefit  - Advised to limit the use of oral anti-inflammatories due to potential side effects on the stomach, kidneys, and blood pressure    Saurabh Marley DO         This medical note was created with the assistance of artificial intelligence (AI) for documentation purposes. The content has been reviewed and confirmed by the healthcare provider for accuracy and completeness. Patient consented to the use of audio recording and use of AI during their visit.

## 2025-07-23 ENCOUNTER — APPOINTMENT (OUTPATIENT)
Dept: PRIMARY CARE | Facility: CLINIC | Age: 65
End: 2025-07-23
Payer: COMMERCIAL

## 2025-07-29 ENCOUNTER — APPOINTMENT (OUTPATIENT)
Dept: PRIMARY CARE | Facility: CLINIC | Age: 65
End: 2025-07-29
Payer: COMMERCIAL

## 2025-07-29 VITALS
OXYGEN SATURATION: 98 % | DIASTOLIC BLOOD PRESSURE: 62 MMHG | HEART RATE: 62 BPM | WEIGHT: 192.8 LBS | HEIGHT: 73 IN | SYSTOLIC BLOOD PRESSURE: 120 MMHG | BODY MASS INDEX: 25.55 KG/M2

## 2025-07-29 DIAGNOSIS — Z81.8 FAMILY HISTORY OF DEMENTIA: ICD-10-CM

## 2025-07-29 DIAGNOSIS — R97.20 ELEVATED PSA: ICD-10-CM

## 2025-07-29 DIAGNOSIS — M19.90 ARTHRITIS: ICD-10-CM

## 2025-07-29 DIAGNOSIS — R05.3 CHRONIC COUGH: ICD-10-CM

## 2025-07-29 DIAGNOSIS — E78.2 MIXED HYPERLIPIDEMIA: Primary | ICD-10-CM

## 2025-07-29 DIAGNOSIS — Z00.00 HEALTHCARE MAINTENANCE: ICD-10-CM

## 2025-07-29 PROCEDURE — 3008F BODY MASS INDEX DOCD: CPT | Performed by: INTERNAL MEDICINE

## 2025-07-29 PROCEDURE — 90471 IMMUNIZATION ADMIN: CPT | Performed by: INTERNAL MEDICINE

## 2025-07-29 PROCEDURE — UHSPHYS PR UH SELECT PHYSICAL: Performed by: INTERNAL MEDICINE

## 2025-07-29 PROCEDURE — 90750 HZV VACC RECOMBINANT IM: CPT | Performed by: INTERNAL MEDICINE

## 2025-07-29 ASSESSMENT — ENCOUNTER SYMPTOMS
FACIAL SWELLING: 0
LIGHT-HEADEDNESS: 0
TROUBLE SWALLOWING: 0
PALPITATIONS: 0
HEMATURIA: 0
POLYDIPSIA: 0
NAUSEA: 0
ABDOMINAL DISTENTION: 0
DIARRHEA: 0
CONSTITUTIONAL NEGATIVE: 1
RHINORRHEA: 0
WHEEZING: 0
CHEST TIGHTNESS: 0
SLEEP DISTURBANCE: 0
NERVOUS/ANXIOUS: 0
NECK STIFFNESS: 1
DYSPHORIC MOOD: 0
FREQUENCY: 0
ACTIVITY CHANGE: 0
ADENOPATHY: 0
WEAKNESS: 0
BRUISES/BLEEDS EASILY: 0
VOMITING: 0
ABDOMINAL PAIN: 0
CHILLS: 0
BACK PAIN: 1
FEVER: 0
DYSURIA: 0
SHORTNESS OF BREATH: 0
FATIGUE: 0
DIZZINESS: 0
SINUS PAIN: 0
SORE THROAT: 0
HEADACHES: 0
JOINT SWELLING: 0
HYPERACTIVE: 0
MYALGIAS: 1
NUMBNESS: 0
BLOOD IN STOOL: 0
CONFUSION: 0
ARTHRALGIAS: 0
CONSTIPATION: 0
SINUS PRESSURE: 0
COUGH: 0
UNEXPECTED WEIGHT CHANGE: 0

## 2025-07-29 NOTE — ASSESSMENT & PLAN NOTE
Patient will get a repeat lipid profile done this morning.  He has had a normal cardiac calcium score and has been watching his diet fairly carefully.  If his LDL remains elevated we may consider medication.  Although the patient is reluctant to start this.

## 2025-07-29 NOTE — ASSESSMENT & PLAN NOTE
PSA levels are stable over the last 3 years.  He has no BPH symptoms and his prostate exam is unremarkable.

## 2025-07-29 NOTE — ASSESSMENT & PLAN NOTE
Patient has a lot of aches and pains which are fairly typical of his age and activity level.  He is reassured he can take ibuprofen as needed he takes no more than 4-week.  I suggested that he avoid any surgical intervention for his Achilles tendinitis given the fairly mild symptoms and his ability to keep it under good control with just a single ibuprofen dose.  He can revisit this if symptoms get worse.

## 2025-07-29 NOTE — ASSESSMENT & PLAN NOTE
Shingles vaccine was given today.  Patient will be seen by our fitness assessment team to get an overall aggressive exercise program.  Patient will be due for colonoscopy next year.

## 2025-07-29 NOTE — PROGRESS NOTES
Luis Ureña       Patient is here for a complete physical and a general checkup.  Feels generally quite well he has a few ongoing issues.  1.  He is significant concerns about dementia.  He does have a strong family history his parents and couple of his older sisters have dementia.  He had the initial screening blood work for dementia which did show an indeterminant score.  He is considering enrolling in a  test study at the [facility] I did encourage him to do that.  He has no symptoms of memory loss himself at this time  2.  He has left Achilles tendinitis and a Michelet's heel that he has been to several physicians for.  Several of them recommended a fairly aggressive surgical repair.  He was offered a Tenex procedure by a physician here and is considering that as well.  However his symptoms are actually fairly mild and he manages them with ibuprofen 600 mg prior to exercise he takes no more than 4 ibuprofen a week.  3.  He has generalized stiffness when he first gets up in the morning he has to do a lot of stretching to get going.  Once he is moving he has no issues.  4.  Is not sleeping as well as he used to but still getting at least 6 and half hours of sleep at night.  He has no daytime somnolence issues  5.  We reviewed his labs all of which are at [facility].  He did not have a lipid profile done for some reason when his blood was drawn in May and will need to repeat that.  6.  BPH symptoms are minimal PSA is stable  7.  He continues to be very active plays a lot of tennis and golf does do some weight training and aerobic exercise.           Review of Systems   Constitutional: Negative.  Negative for activity change, chills, fatigue, fever and unexpected weight change.   HENT:  Negative for dental problem, ear pain, facial swelling, hearing loss, mouth sores, rhinorrhea, sinus pressure, sinus pain, sore throat, tinnitus and trouble swallowing.    Eyes:  Negative for visual disturbance.  "  Respiratory:  Negative for cough, chest tightness, shortness of breath and wheezing.    Cardiovascular:  Negative for chest pain, palpitations and leg swelling.   Gastrointestinal:  Negative for abdominal distention, abdominal pain, blood in stool, constipation, diarrhea, nausea and vomiting.   Endocrine: Negative for cold intolerance, heat intolerance, polydipsia and polyuria.   Genitourinary:  Negative for dysuria, frequency, hematuria and urgency.   Musculoskeletal:  Positive for back pain, myalgias and neck stiffness. Negative for arthralgias and joint swelling.   Skin:  Negative for rash.   Allergic/Immunologic: Negative for food allergies.   Neurological:  Negative for dizziness, weakness, light-headedness, numbness and headaches.   Hematological:  Negative for adenopathy. Does not bruise/bleed easily.   Psychiatric/Behavioral:  Negative for confusion, dysphoric mood and sleep disturbance. The patient is not nervous/anxious and is not hyperactive.           Objective      Visit Vitals  /62   Pulse 62   Ht 1.86 m (6' 1.23\")   Wt 87.5 kg (192 lb 12.8 oz)   SpO2 98%   BMI 25.28 kg/m²   Smoking Status Never   BSA 2.13 m²        Physical Exam  Constitutional:       Appearance: Normal appearance. He is normal weight.   HENT:      Head: Normocephalic.      Right Ear: Tympanic membrane and ear canal normal.      Left Ear: Tympanic membrane and ear canal normal.      Nose: Nose normal.      Mouth/Throat:      Pharynx: Oropharynx is clear.     Eyes:      Extraocular Movements: Extraocular movements intact.      Conjunctiva/sclera: Conjunctivae normal.      Pupils: Pupils are equal, round, and reactive to light.     Neck:      Thyroid: No thyroid mass or thyromegaly.      Vascular: No carotid bruit or JVD.     Cardiovascular:      Rate and Rhythm: Normal rate and regular rhythm.      Pulses: Normal pulses.           Carotid pulses are 2+ on the right side and 2+ on the left side.       Radial pulses are 2+ on the " right side and 2+ on the left side.        Femoral pulses are 2+ on the right side and 2+ on the left side.       Popliteal pulses are 2+ on the right side and 2+ on the left side.        Dorsalis pedis pulses are 2+ on the right side and 2+ on the left side.        Posterior tibial pulses are 2+ on the right side and 2+ on the left side.      Heart sounds: Normal heart sounds. No murmur heard.  Pulmonary:      Effort: Pulmonary effort is normal.      Breath sounds: Normal breath sounds.   Abdominal:      General: Abdomen is flat. Bowel sounds are normal. There is no distension.      Palpations: Abdomen is soft. There is no mass.      Tenderness: There is no guarding or rebound.   Genitourinary:     Penis: Normal.       Prostate: Normal.      Rectum: Normal.     Musculoskeletal:         General: Normal range of motion.      Cervical back: Normal range of motion and neck supple.      Right lower leg: No edema.      Left lower leg: No edema.   Lymphadenopathy:      Cervical: No cervical adenopathy.     Skin:     General: Skin is warm and dry.      Findings: No lesion or rash.     Neurological:      General: No focal deficit present.      Mental Status: He is alert and oriented to person, place, and time.     Psychiatric:         Mood and Affect: Mood normal.              Assess/Plan SmartLinks:   Problem List Items Addressed This Visit           ICD-10-CM    Elevated PSA R97.20    PSA levels are stable over the last 3 years.  He has no BPH symptoms and his prostate exam is unremarkable.         Mixed hyperlipidemia - Primary E78.2    Patient will get a repeat lipid profile done this morning.  He has had a normal cardiac calcium score and has been watching his diet fairly carefully.  If his LDL remains elevated we may consider medication.  Although the patient is reluctant to start this.         Relevant Orders    Lipid Panel    Family history of dementia Z81.8    Patient's hemorrhoids screening test was indeterminant  but on the low end of that scale.  Patient is considering enrolling in the [facility] study program I think that makes sense apparently that will include a PET amyloid scan.  Patient will continue to follow along and continue to remain physically and intellectually active.         Chronic cough R05.3    Symptoms have resolved patient no longer on pantoprazole.  Doing quite well.         Arthritis M19.90    Patient has a lot of aches and pains which are fairly typical of his age and activity level.  He is reassured he can take ibuprofen as needed he takes no more than 4-week.  I suggested that he avoid any surgical intervention for his Achilles tendinitis given the fairly mild symptoms and his ability to keep it under good control with just a single ibuprofen dose.  He can revisit this if symptoms get worse.         Healthcare maintenance Z00.00    Shingles vaccine was given today.  Patient will be seen by our fitness assessment team to get an overall aggressive exercise program.  Patient will be due for colonoscopy next year.         Relevant Orders    Zoster vaccine, recombinant, adult (SHINGRIX) (Completed)

## 2025-07-29 NOTE — ASSESSMENT & PLAN NOTE
Patient's hemorrhoids screening test was indeterminant but on the low end of that scale.  Patient is considering enrolling in the [facility] study program I think that makes sense apparently that will include a PET amyloid scan.  Patient will continue to follow along and continue to remain physically and intellectually active.

## 2025-08-09 LAB
CHOLEST SERPL-MCNC: 218 MG/DL
CHOLEST/HDLC SERPL: 3.3 (CALC)
HDLC SERPL-MCNC: 66 MG/DL
LDLC SERPL CALC-MCNC: 135 MG/DL (CALC)
NONHDLC SERPL-MCNC: 152 MG/DL (CALC)
TRIGL SERPL-MCNC: 80 MG/DL

## 2025-08-12 LAB
CHOLEST SERPL-MCNC: 218 MG/DL
CHOLEST/HDLC SERPL: 3.3 (CALC)
CRP SERPL-MCNC: <3 MG/L
HDLC SERPL-MCNC: 66 MG/DL
LDLC SERPL CALC-MCNC: 135 MG/DL (CALC)
LPA SERPL-SCNC: <10 NMOL/L
NONHDLC SERPL-MCNC: 152 MG/DL (CALC)
TRIGL SERPL-MCNC: 80 MG/DL

## 2025-10-06 ENCOUNTER — APPOINTMENT (OUTPATIENT)
Dept: PRIMARY CARE | Facility: CLINIC | Age: 65
End: 2025-10-06
Payer: COMMERCIAL

## 2025-10-08 ENCOUNTER — APPOINTMENT (OUTPATIENT)
Dept: PRIMARY CARE | Facility: CLINIC | Age: 65
End: 2025-10-08
Payer: COMMERCIAL